# Patient Record
Sex: FEMALE | Race: WHITE | Employment: FULL TIME | URBAN - METROPOLITAN AREA
[De-identification: names, ages, dates, MRNs, and addresses within clinical notes are randomized per-mention and may not be internally consistent; named-entity substitution may affect disease eponyms.]

---

## 2020-11-03 ENCOUNTER — HOSPITAL ENCOUNTER (OUTPATIENT)
Dept: PREADMISSION TESTING | Age: 30
Discharge: HOME | End: 2020-11-03
Payer: COMMERCIAL

## 2020-11-03 VITALS
HEIGHT: 67 IN | SYSTOLIC BLOOD PRESSURE: 111 MMHG | TEMPERATURE: 97.8 F | BODY MASS INDEX: 22.76 KG/M2 | DIASTOLIC BLOOD PRESSURE: 65 MMHG | HEART RATE: 47 BPM | WEIGHT: 145 LBS

## 2020-11-03 LAB
ERYTHROCYTE [DISTWIDTH] IN BLOOD BY AUTOMATED COUNT: 11.9 % (ref 11.5–14.5)
HCT VFR BLD AUTO: 40 % (ref 35–47)
HGB BLD-MCNC: 13.5 G/DL (ref 11.5–16)
MCH RBC QN AUTO: 32.5 PG (ref 26–34)
MCHC RBC AUTO-ENTMCNC: 33.8 G/DL (ref 30–36.5)
MCV RBC AUTO: 96.2 FL (ref 80–99)
NRBC # BLD: 0 K/UL (ref 0–0.01)
NRBC BLD-RTO: 0 PER 100 WBC
PLATELET # BLD AUTO: 246 K/UL (ref 150–400)
PMV BLD AUTO: 10.8 FL (ref 8.9–12.9)
RBC # BLD AUTO: 4.16 M/UL (ref 3.8–5.2)
WBC # BLD AUTO: 5.9 K/UL (ref 3.6–11)

## 2020-11-03 PROCEDURE — 36415 COLL VENOUS BLD VENIPUNCTURE: CPT

## 2020-11-03 PROCEDURE — 85027 COMPLETE CBC AUTOMATED: CPT

## 2020-11-03 RX ORDER — CHOLECALCIFEROL (VITAMIN D3) 50 MCG
CAPSULE ORAL
COMMUNITY

## 2020-11-03 NOTE — PERIOP NOTES
PATIENT MADE AWARE OF NEED FOR COVID-19 TESTING WITHIN 96 HOURS OF SURGERY. PATIENT INSTRUCTED TO EXPECT A CALL TO SCHEDULE APPT FOR TEST. PATIENT INSTRUCTED TO SELF QUARANTINE BETWEEN TESTING AND ARRIVAL TIME DAY OF SURGERY. Patient verbalizes understanding of preoperative instructions:  Given skin prep chlorhexidine wipes-given written and verbal instructions on use. Patient given surgical site infection FAQs handout and hand hygiene tips sheet. Pre-operative instructions reviewed and patient verbalizes understanding of instructions. Patient has been given the opportunity to ask additional questions. Pre-Operative Instructions    DO NOT EAT OR DRINK ANYTHING AFTER MIDNIGHT THE NIGHT BEFORE SURGERY.

## 2020-11-06 ENCOUNTER — HOSPITAL ENCOUNTER (OUTPATIENT)
Dept: PREADMISSION TESTING | Age: 30
Discharge: HOME | End: 2020-11-06
Payer: COMMERCIAL

## 2020-11-06 ENCOUNTER — TRANSCRIBE ORDER (OUTPATIENT)
Dept: REGISTRATION | Age: 30
End: 2020-11-06

## 2020-11-06 DIAGNOSIS — Z01.812 PRE-PROCEDURE LAB EXAM: ICD-10-CM

## 2020-11-06 DIAGNOSIS — Z01.812 PRE-PROCEDURE LAB EXAM: Primary | ICD-10-CM

## 2020-11-06 PROCEDURE — 87635 SARS-COV-2 COVID-19 AMP PRB: CPT

## 2020-11-07 LAB — SARS-COV-2, COV2NT: NOT DETECTED

## 2020-11-09 ENCOUNTER — ANESTHESIA EVENT (OUTPATIENT)
Dept: SURGERY | Age: 30
DRG: 743 | End: 2020-11-09
Payer: COMMERCIAL

## 2020-11-10 ENCOUNTER — HOSPITAL ENCOUNTER (INPATIENT)
Age: 30
LOS: 2 days | Discharge: HOME | DRG: 743 | End: 2020-11-12
Attending: OBSTETRICS & GYNECOLOGY | Admitting: OBSTETRICS & GYNECOLOGY
Payer: COMMERCIAL

## 2020-11-10 ENCOUNTER — ANESTHESIA (OUTPATIENT)
Dept: SURGERY | Age: 30
DRG: 743 | End: 2020-11-10
Payer: COMMERCIAL

## 2020-11-10 DIAGNOSIS — G89.18 POSTOPERATIVE PAIN: Primary | ICD-10-CM

## 2020-11-10 PROBLEM — D25.9 FIBROID UTERUS: Status: ACTIVE | Noted: 2020-11-10

## 2020-11-10 LAB
BASOPHILS # BLD: 0 K/UL (ref 0–0.1)
BASOPHILS NFR BLD: 0 % (ref 0–1)
DIFFERENTIAL METHOD BLD: ABNORMAL
EOSINOPHIL # BLD: 0 K/UL (ref 0–0.4)
EOSINOPHIL NFR BLD: 0 % (ref 0–7)
ERYTHROCYTE [DISTWIDTH] IN BLOOD BY AUTOMATED COUNT: 11.8 % (ref 11.5–14.5)
HCG UR QL: NEGATIVE
HCT VFR BLD AUTO: 34 % (ref 35–47)
HGB BLD-MCNC: 11.8 G/DL (ref 11.5–16)
IMM GRANULOCYTES # BLD AUTO: 0 K/UL (ref 0–0.04)
IMM GRANULOCYTES NFR BLD AUTO: 0 % (ref 0–0.5)
LYMPHOCYTES # BLD: 1.1 K/UL (ref 0.8–3.5)
LYMPHOCYTES NFR BLD: 8 % (ref 12–49)
MCH RBC QN AUTO: 32.7 PG (ref 26–34)
MCHC RBC AUTO-ENTMCNC: 34.7 G/DL (ref 30–36.5)
MCV RBC AUTO: 94.2 FL (ref 80–99)
MONOCYTES # BLD: 0.9 K/UL (ref 0–1)
MONOCYTES NFR BLD: 7 % (ref 5–13)
NEUTS SEG # BLD: 11.1 K/UL (ref 1.8–8)
NEUTS SEG NFR BLD: 85 % (ref 32–75)
NRBC # BLD: 0 K/UL (ref 0–0.01)
NRBC BLD-RTO: 0 PER 100 WBC
PLATELET # BLD AUTO: 210 K/UL (ref 150–400)
PMV BLD AUTO: 10.4 FL (ref 8.9–12.9)
RBC # BLD AUTO: 3.61 M/UL (ref 3.8–5.2)
WBC # BLD AUTO: 13.1 K/UL (ref 3.6–11)

## 2020-11-10 PROCEDURE — 77030040361 HC SLV COMPR DVT MDII -B: Performed by: OBSTETRICS & GYNECOLOGY

## 2020-11-10 PROCEDURE — 74011250636 HC RX REV CODE- 250/636: Performed by: OBSTETRICS & GYNECOLOGY

## 2020-11-10 PROCEDURE — 77030031139 HC SUT VCRL2 J&J -A: Performed by: OBSTETRICS & GYNECOLOGY

## 2020-11-10 PROCEDURE — 74011250636 HC RX REV CODE- 250/636: Performed by: NURSE ANESTHETIST, CERTIFIED REGISTERED

## 2020-11-10 PROCEDURE — 74011000250 HC RX REV CODE- 250: Performed by: NURSE ANESTHETIST, CERTIFIED REGISTERED

## 2020-11-10 PROCEDURE — 77030008684 HC TU ET CUF COVD -B: Performed by: ANESTHESIOLOGY

## 2020-11-10 PROCEDURE — 85025 COMPLETE CBC W/AUTO DIFF WBC: CPT

## 2020-11-10 PROCEDURE — 77030008462 HC STPLR SKN PROX J&J -A: Performed by: OBSTETRICS & GYNECOLOGY

## 2020-11-10 PROCEDURE — 76210000006 HC OR PH I REC 0.5 TO 1 HR: Performed by: OBSTETRICS & GYNECOLOGY

## 2020-11-10 PROCEDURE — 36415 COLL VENOUS BLD VENIPUNCTURE: CPT

## 2020-11-10 PROCEDURE — 77030002933 HC SUT MCRYL J&J -A: Performed by: OBSTETRICS & GYNECOLOGY

## 2020-11-10 PROCEDURE — 77030041075 HC DRSG AG OPTIFRM MDII -B: Performed by: OBSTETRICS & GYNECOLOGY

## 2020-11-10 PROCEDURE — 2709999900 HC NON-CHARGEABLE SUPPLY: Performed by: OBSTETRICS & GYNECOLOGY

## 2020-11-10 PROCEDURE — 74011250637 HC RX REV CODE- 250/637: Performed by: ANESTHESIOLOGY

## 2020-11-10 PROCEDURE — 88305 TISSUE EXAM BY PATHOLOGIST: CPT

## 2020-11-10 PROCEDURE — 0UB90ZZ EXCISION OF UTERUS, OPEN APPROACH: ICD-10-PCS | Performed by: OBSTETRICS & GYNECOLOGY

## 2020-11-10 PROCEDURE — 76060000035 HC ANESTHESIA 2 TO 2.5 HR: Performed by: OBSTETRICS & GYNECOLOGY

## 2020-11-10 PROCEDURE — 77030002974 HC SUT PLN J&J -A: Performed by: OBSTETRICS & GYNECOLOGY

## 2020-11-10 PROCEDURE — 74011000258 HC RX REV CODE- 258: Performed by: OBSTETRICS & GYNECOLOGY

## 2020-11-10 PROCEDURE — 99218 HC RM OBSERVATION: CPT

## 2020-11-10 PROCEDURE — 74011250637 HC RX REV CODE- 250/637: Performed by: NURSE ANESTHETIST, CERTIFIED REGISTERED

## 2020-11-10 PROCEDURE — 88331 PATH CONSLTJ SURG 1 BLK 1SPC: CPT

## 2020-11-10 PROCEDURE — 74011250636 HC RX REV CODE- 250/636: Performed by: ANESTHESIOLOGY

## 2020-11-10 PROCEDURE — 76010000131 HC OR TIME 2 TO 2.5 HR: Performed by: OBSTETRICS & GYNECOLOGY

## 2020-11-10 PROCEDURE — 81025 URINE PREGNANCY TEST: CPT

## 2020-11-10 PROCEDURE — 77030026438 HC STYL ET INTUB CARD -A: Performed by: ANESTHESIOLOGY

## 2020-11-10 PROCEDURE — 77030040830 HC CATH URETH FOL MDII -A: Performed by: OBSTETRICS & GYNECOLOGY

## 2020-11-10 PROCEDURE — 74011250637 HC RX REV CODE- 250/637: Performed by: OBSTETRICS & GYNECOLOGY

## 2020-11-10 PROCEDURE — 77030040922 HC BLNKT HYPOTHRM STRY -A

## 2020-11-10 PROCEDURE — 65410000002 HC RM PRIVATE OB

## 2020-11-10 PROCEDURE — C1765 ADHESION BARRIER: HCPCS | Performed by: OBSTETRICS & GYNECOLOGY

## 2020-11-10 RX ORDER — IBUPROFEN 400 MG/1
800 TABLET ORAL
Status: DISCONTINUED | OUTPATIENT
Start: 2020-11-10 | End: 2020-11-12 | Stop reason: HOSPADM

## 2020-11-10 RX ORDER — SODIUM CHLORIDE 0.9 % (FLUSH) 0.9 %
5-40 SYRINGE (ML) INJECTION EVERY 8 HOURS
Status: DISCONTINUED | OUTPATIENT
Start: 2020-11-10 | End: 2020-11-10 | Stop reason: HOSPADM

## 2020-11-10 RX ORDER — SODIUM CHLORIDE 0.9 % (FLUSH) 0.9 %
5-40 SYRINGE (ML) INJECTION EVERY 8 HOURS
Status: DISCONTINUED | OUTPATIENT
Start: 2020-11-10 | End: 2020-11-12 | Stop reason: HOSPADM

## 2020-11-10 RX ORDER — DEXAMETHASONE SODIUM PHOSPHATE 4 MG/ML
INJECTION, SOLUTION INTRA-ARTICULAR; INTRALESIONAL; INTRAMUSCULAR; INTRAVENOUS; SOFT TISSUE AS NEEDED
Status: DISCONTINUED | OUTPATIENT
Start: 2020-11-10 | End: 2020-11-10 | Stop reason: HOSPADM

## 2020-11-10 RX ORDER — EPHEDRINE SULFATE/0.9% NACL/PF 50 MG/5 ML
SYRINGE (ML) INTRAVENOUS AS NEEDED
Status: DISCONTINUED | OUTPATIENT
Start: 2020-11-10 | End: 2020-11-10 | Stop reason: HOSPADM

## 2020-11-10 RX ORDER — HYDROMORPHONE HYDROCHLORIDE 1 MG/ML
0.2 INJECTION, SOLUTION INTRAMUSCULAR; INTRAVENOUS; SUBCUTANEOUS
Status: DISCONTINUED | OUTPATIENT
Start: 2020-11-10 | End: 2020-11-10 | Stop reason: HOSPADM

## 2020-11-10 RX ORDER — OXYCODONE HYDROCHLORIDE 5 MG/1
5 TABLET ORAL AS NEEDED
Status: DISCONTINUED | OUTPATIENT
Start: 2020-11-10 | End: 2020-11-10 | Stop reason: HOSPADM

## 2020-11-10 RX ORDER — DIPHENHYDRAMINE HYDROCHLORIDE 50 MG/ML
12.5 INJECTION, SOLUTION INTRAMUSCULAR; INTRAVENOUS AS NEEDED
Status: DISCONTINUED | OUTPATIENT
Start: 2020-11-10 | End: 2020-11-10 | Stop reason: HOSPADM

## 2020-11-10 RX ORDER — ONDANSETRON 2 MG/ML
4 INJECTION INTRAMUSCULAR; INTRAVENOUS AS NEEDED
Status: DISCONTINUED | OUTPATIENT
Start: 2020-11-10 | End: 2020-11-10 | Stop reason: HOSPADM

## 2020-11-10 RX ORDER — ONDANSETRON 2 MG/ML
4 INJECTION INTRAMUSCULAR; INTRAVENOUS
Status: DISCONTINUED | OUTPATIENT
Start: 2020-11-10 | End: 2020-11-12 | Stop reason: HOSPADM

## 2020-11-10 RX ORDER — MIDAZOLAM HYDROCHLORIDE 1 MG/ML
1 INJECTION, SOLUTION INTRAMUSCULAR; INTRAVENOUS AS NEEDED
Status: DISCONTINUED | OUTPATIENT
Start: 2020-11-10 | End: 2020-11-10 | Stop reason: HOSPADM

## 2020-11-10 RX ORDER — ACETAMINOPHEN 325 MG/1
650 TABLET ORAL ONCE
Status: COMPLETED | OUTPATIENT
Start: 2020-11-10 | End: 2020-11-10

## 2020-11-10 RX ORDER — MORPHINE SULFATE 2 MG/ML
2 INJECTION, SOLUTION INTRAMUSCULAR; INTRAVENOUS
Status: DISCONTINUED | OUTPATIENT
Start: 2020-11-10 | End: 2020-11-12 | Stop reason: HOSPADM

## 2020-11-10 RX ORDER — SODIUM CHLORIDE 9 MG/ML
25 INJECTION, SOLUTION INTRAVENOUS CONTINUOUS
Status: DISCONTINUED | OUTPATIENT
Start: 2020-11-10 | End: 2020-11-10 | Stop reason: HOSPADM

## 2020-11-10 RX ORDER — SCOLOPAMINE TRANSDERMAL SYSTEM 1 MG/1
PATCH, EXTENDED RELEASE TRANSDERMAL AS NEEDED
Status: DISCONTINUED | OUTPATIENT
Start: 2020-11-10 | End: 2020-11-10 | Stop reason: HOSPADM

## 2020-11-10 RX ORDER — DEXTROSE, SODIUM CHLORIDE, SODIUM LACTATE, POTASSIUM CHLORIDE, AND CALCIUM CHLORIDE 5; .6; .31; .03; .02 G/100ML; G/100ML; G/100ML; G/100ML; G/100ML
125 INJECTION, SOLUTION INTRAVENOUS CONTINUOUS
Status: DISCONTINUED | OUTPATIENT
Start: 2020-11-10 | End: 2020-11-12

## 2020-11-10 RX ORDER — FENTANYL CITRATE 50 UG/ML
INJECTION, SOLUTION INTRAMUSCULAR; INTRAVENOUS AS NEEDED
Status: DISCONTINUED | OUTPATIENT
Start: 2020-11-10 | End: 2020-11-10 | Stop reason: HOSPADM

## 2020-11-10 RX ORDER — SODIUM CHLORIDE, SODIUM LACTATE, POTASSIUM CHLORIDE, CALCIUM CHLORIDE 600; 310; 30; 20 MG/100ML; MG/100ML; MG/100ML; MG/100ML
25 INJECTION, SOLUTION INTRAVENOUS CONTINUOUS
Status: DISCONTINUED | OUTPATIENT
Start: 2020-11-10 | End: 2020-11-10 | Stop reason: HOSPADM

## 2020-11-10 RX ORDER — DOCUSATE SODIUM 100 MG/1
100 CAPSULE, LIQUID FILLED ORAL 2 TIMES DAILY
Status: DISCONTINUED | OUTPATIENT
Start: 2020-11-10 | End: 2020-11-12 | Stop reason: HOSPADM

## 2020-11-10 RX ORDER — PROPOFOL 10 MG/ML
INJECTION, EMULSION INTRAVENOUS AS NEEDED
Status: DISCONTINUED | OUTPATIENT
Start: 2020-11-10 | End: 2020-11-10 | Stop reason: HOSPADM

## 2020-11-10 RX ORDER — SODIUM CHLORIDE 0.9 % (FLUSH) 0.9 %
5-40 SYRINGE (ML) INJECTION AS NEEDED
Status: DISCONTINUED | OUTPATIENT
Start: 2020-11-10 | End: 2020-11-12 | Stop reason: HOSPADM

## 2020-11-10 RX ORDER — DIPHENHYDRAMINE HYDROCHLORIDE 50 MG/ML
12.5 INJECTION, SOLUTION INTRAMUSCULAR; INTRAVENOUS
Status: DISCONTINUED | OUTPATIENT
Start: 2020-11-10 | End: 2020-11-12 | Stop reason: HOSPADM

## 2020-11-10 RX ORDER — MIDAZOLAM HYDROCHLORIDE 1 MG/ML
0.5 INJECTION, SOLUTION INTRAMUSCULAR; INTRAVENOUS
Status: DISCONTINUED | OUTPATIENT
Start: 2020-11-10 | End: 2020-11-10 | Stop reason: HOSPADM

## 2020-11-10 RX ORDER — ONDANSETRON 2 MG/ML
INJECTION INTRAMUSCULAR; INTRAVENOUS AS NEEDED
Status: DISCONTINUED | OUTPATIENT
Start: 2020-11-10 | End: 2020-11-10 | Stop reason: HOSPADM

## 2020-11-10 RX ORDER — KETOROLAC TROMETHAMINE 30 MG/ML
INJECTION, SOLUTION INTRAMUSCULAR; INTRAVENOUS AS NEEDED
Status: DISCONTINUED | OUTPATIENT
Start: 2020-11-10 | End: 2020-11-10 | Stop reason: HOSPADM

## 2020-11-10 RX ORDER — LIDOCAINE HYDROCHLORIDE 10 MG/ML
0.1 INJECTION, SOLUTION EPIDURAL; INFILTRATION; INTRACAUDAL; PERINEURAL AS NEEDED
Status: DISCONTINUED | OUTPATIENT
Start: 2020-11-10 | End: 2020-11-10 | Stop reason: HOSPADM

## 2020-11-10 RX ORDER — MORPHINE SULFATE 10 MG/ML
2 INJECTION, SOLUTION INTRAMUSCULAR; INTRAVENOUS
Status: DISCONTINUED | OUTPATIENT
Start: 2020-11-10 | End: 2020-11-10 | Stop reason: HOSPADM

## 2020-11-10 RX ORDER — DEXMEDETOMIDINE HYDROCHLORIDE 100 UG/ML
INJECTION, SOLUTION INTRAVENOUS AS NEEDED
Status: DISCONTINUED | OUTPATIENT
Start: 2020-11-10 | End: 2020-11-10 | Stop reason: HOSPADM

## 2020-11-10 RX ORDER — LIDOCAINE HYDROCHLORIDE 20 MG/ML
INJECTION, SOLUTION EPIDURAL; INFILTRATION; INTRACAUDAL; PERINEURAL AS NEEDED
Status: DISCONTINUED | OUTPATIENT
Start: 2020-11-10 | End: 2020-11-10 | Stop reason: HOSPADM

## 2020-11-10 RX ORDER — OXYCODONE AND ACETAMINOPHEN 5; 325 MG/1; MG/1
1 TABLET ORAL
Status: DISCONTINUED | OUTPATIENT
Start: 2020-11-10 | End: 2020-11-12 | Stop reason: HOSPADM

## 2020-11-10 RX ORDER — FENTANYL CITRATE 50 UG/ML
50 INJECTION, SOLUTION INTRAMUSCULAR; INTRAVENOUS AS NEEDED
Status: DISCONTINUED | OUTPATIENT
Start: 2020-11-10 | End: 2020-11-10 | Stop reason: HOSPADM

## 2020-11-10 RX ORDER — SODIUM CHLORIDE 0.9 % (FLUSH) 0.9 %
5-40 SYRINGE (ML) INJECTION AS NEEDED
Status: DISCONTINUED | OUTPATIENT
Start: 2020-11-10 | End: 2020-11-10 | Stop reason: HOSPADM

## 2020-11-10 RX ORDER — SUCCINYLCHOLINE CHLORIDE 20 MG/ML
INJECTION INTRAMUSCULAR; INTRAVENOUS AS NEEDED
Status: DISCONTINUED | OUTPATIENT
Start: 2020-11-10 | End: 2020-11-10 | Stop reason: HOSPADM

## 2020-11-10 RX ORDER — KETAMINE HYDROCHLORIDE 10 MG/ML
INJECTION, SOLUTION INTRAMUSCULAR; INTRAVENOUS AS NEEDED
Status: DISCONTINUED | OUTPATIENT
Start: 2020-11-10 | End: 2020-11-10 | Stop reason: HOSPADM

## 2020-11-10 RX ORDER — GLYCOPYRROLATE 0.2 MG/ML
INJECTION INTRAMUSCULAR; INTRAVENOUS AS NEEDED
Status: DISCONTINUED | OUTPATIENT
Start: 2020-11-10 | End: 2020-11-10 | Stop reason: HOSPADM

## 2020-11-10 RX ORDER — FENTANYL CITRATE 50 UG/ML
25 INJECTION, SOLUTION INTRAMUSCULAR; INTRAVENOUS
Status: DISCONTINUED | OUTPATIENT
Start: 2020-11-10 | End: 2020-11-10 | Stop reason: HOSPADM

## 2020-11-10 RX ORDER — HYDROMORPHONE HYDROCHLORIDE 2 MG/ML
INJECTION, SOLUTION INTRAMUSCULAR; INTRAVENOUS; SUBCUTANEOUS AS NEEDED
Status: DISCONTINUED | OUTPATIENT
Start: 2020-11-10 | End: 2020-11-10 | Stop reason: HOSPADM

## 2020-11-10 RX ORDER — CEFAZOLIN SODIUM/WATER 2 G/20 ML
2 SYRINGE (ML) INTRAVENOUS ONCE
Status: COMPLETED | OUTPATIENT
Start: 2020-11-10 | End: 2020-11-10

## 2020-11-10 RX ORDER — MIDAZOLAM HYDROCHLORIDE 1 MG/ML
INJECTION, SOLUTION INTRAMUSCULAR; INTRAVENOUS AS NEEDED
Status: DISCONTINUED | OUTPATIENT
Start: 2020-11-10 | End: 2020-11-10 | Stop reason: HOSPADM

## 2020-11-10 RX ORDER — SODIUM CHLORIDE, SODIUM LACTATE, POTASSIUM CHLORIDE, CALCIUM CHLORIDE 600; 310; 30; 20 MG/100ML; MG/100ML; MG/100ML; MG/100ML
125 INJECTION, SOLUTION INTRAVENOUS CONTINUOUS
Status: DISCONTINUED | OUTPATIENT
Start: 2020-11-10 | End: 2020-11-10 | Stop reason: HOSPADM

## 2020-11-10 RX ORDER — ROCURONIUM BROMIDE 10 MG/ML
INJECTION, SOLUTION INTRAVENOUS AS NEEDED
Status: DISCONTINUED | OUTPATIENT
Start: 2020-11-10 | End: 2020-11-10 | Stop reason: HOSPADM

## 2020-11-10 RX ADMIN — IBUPROFEN 800 MG: 400 TABLET, FILM COATED ORAL at 17:42

## 2020-11-10 RX ADMIN — DOCUSATE SODIUM 100 MG: 100 CAPSULE, LIQUID FILLED ORAL at 17:42

## 2020-11-10 RX ADMIN — GLYCOPYRROLATE 0.2 MG: 0.2 INJECTION, SOLUTION INTRAMUSCULAR; INTRAVENOUS at 11:54

## 2020-11-10 RX ADMIN — ONDANSETRON 4 MG: 2 INJECTION INTRAMUSCULAR; INTRAVENOUS at 22:42

## 2020-11-10 RX ADMIN — ROCURONIUM BROMIDE 25 MG: 10 SOLUTION INTRAVENOUS at 12:02

## 2020-11-10 RX ADMIN — HYDROMORPHONE HYDROCHLORIDE 0.2 MG: 2 INJECTION, SOLUTION INTRAMUSCULAR; INTRAVENOUS; SUBCUTANEOUS at 13:18

## 2020-11-10 RX ADMIN — Medication 5 ML: at 22:00

## 2020-11-10 RX ADMIN — PROPOFOL 150 MG: 10 INJECTION, EMULSION INTRAVENOUS at 11:55

## 2020-11-10 RX ADMIN — Medication 2 G: at 12:05

## 2020-11-10 RX ADMIN — DEXMEDETOMIDINE HYDROCHLORIDE 10 MCG: 100 INJECTION, SOLUTION, CONCENTRATE INTRAVENOUS at 12:04

## 2020-11-10 RX ADMIN — HYDROMORPHONE HYDROCHLORIDE 0.5 MG: 2 INJECTION, SOLUTION INTRAMUSCULAR; INTRAVENOUS; SUBCUTANEOUS at 13:55

## 2020-11-10 RX ADMIN — HYDROMORPHONE HYDROCHLORIDE 0.3 MG: 2 INJECTION, SOLUTION INTRAMUSCULAR; INTRAVENOUS; SUBCUTANEOUS at 13:34

## 2020-11-10 RX ADMIN — FENTANYL CITRATE 50 MCG: 50 INJECTION, SOLUTION INTRAMUSCULAR; INTRAVENOUS at 12:00

## 2020-11-10 RX ADMIN — SCOPALAMINE 1 PATCH: 1 PATCH, EXTENDED RELEASE TRANSDERMAL at 11:50

## 2020-11-10 RX ADMIN — DEXAMETHASONE SODIUM PHOSPHATE 4 MG: 4 INJECTION, SOLUTION INTRAMUSCULAR; INTRAVENOUS at 12:11

## 2020-11-10 RX ADMIN — KETAMINE HYDROCHLORIDE 10 MG: 10 INJECTION, SOLUTION INTRAMUSCULAR; INTRAVENOUS at 12:16

## 2020-11-10 RX ADMIN — SODIUM CHLORIDE, SODIUM LACTATE, POTASSIUM CHLORIDE, AND CALCIUM CHLORIDE 25 ML/HR: 600; 310; 30; 20 INJECTION, SOLUTION INTRAVENOUS at 11:11

## 2020-11-10 RX ADMIN — DEXMEDETOMIDINE HYDROCHLORIDE 10 MCG: 100 INJECTION, SOLUTION, CONCENTRATE INTRAVENOUS at 12:15

## 2020-11-10 RX ADMIN — MIDAZOLAM 2 MG: 1 INJECTION INTRAMUSCULAR; INTRAVENOUS at 11:48

## 2020-11-10 RX ADMIN — PROPOFOL 50 MG: 10 INJECTION, EMULSION INTRAVENOUS at 11:58

## 2020-11-10 RX ADMIN — SODIUM CHLORIDE, SODIUM LACTATE, POTASSIUM CHLORIDE, CALCIUM CHLORIDE, AND DEXTROSE MONOHYDRATE 125 ML/HR: 600; 310; 30; 20; 5 INJECTION, SOLUTION INTRAVENOUS at 14:00

## 2020-11-10 RX ADMIN — SUGAMMADEX 132 MG: 100 INJECTION, SOLUTION INTRAVENOUS at 13:15

## 2020-11-10 RX ADMIN — FENTANYL CITRATE 50 MCG: 50 INJECTION, SOLUTION INTRAMUSCULAR; INTRAVENOUS at 11:55

## 2020-11-10 RX ADMIN — ONDANSETRON HYDROCHLORIDE 4 MG: 2 INJECTION, SOLUTION INTRAMUSCULAR; INTRAVENOUS at 12:26

## 2020-11-10 RX ADMIN — KETAMINE HYDROCHLORIDE 20 MG: 10 INJECTION, SOLUTION INTRAMUSCULAR; INTRAVENOUS at 11:56

## 2020-11-10 RX ADMIN — HYDROMORPHONE HYDROCHLORIDE 0.5 MG: 2 INJECTION, SOLUTION INTRAMUSCULAR; INTRAVENOUS; SUBCUTANEOUS at 14:00

## 2020-11-10 RX ADMIN — SUCCINYLCHOLINE CHLORIDE 120 MG: 20 INJECTION, SOLUTION INTRAMUSCULAR; INTRAVENOUS at 11:56

## 2020-11-10 RX ADMIN — ROCURONIUM BROMIDE 10 MG: 10 SOLUTION INTRAVENOUS at 11:55

## 2020-11-10 RX ADMIN — ACETAMINOPHEN 650 MG: 325 TABLET ORAL at 11:17

## 2020-11-10 RX ADMIN — HYDROMORPHONE HYDROCHLORIDE 0.2 MG: 2 INJECTION, SOLUTION INTRAMUSCULAR; INTRAVENOUS; SUBCUTANEOUS at 13:33

## 2020-11-10 RX ADMIN — KETOROLAC TROMETHAMINE 30 MG: 30 INJECTION, SOLUTION INTRAMUSCULAR; INTRAVENOUS at 13:13

## 2020-11-10 RX ADMIN — ROCURONIUM BROMIDE 10 MG: 10 SOLUTION INTRAVENOUS at 12:25

## 2020-11-10 RX ADMIN — Medication 10 MG: at 12:34

## 2020-11-10 RX ADMIN — HYDROMORPHONE HYDROCHLORIDE 0.3 MG: 2 INJECTION, SOLUTION INTRAMUSCULAR; INTRAVENOUS; SUBCUTANEOUS at 13:07

## 2020-11-10 RX ADMIN — PROPOFOL 50 MG: 10 INJECTION, EMULSION INTRAVENOUS at 13:06

## 2020-11-10 RX ADMIN — LIDOCAINE HYDROCHLORIDE 60 MG: 20 INJECTION, SOLUTION EPIDURAL; INFILTRATION; INTRACAUDAL; PERINEURAL at 11:55

## 2020-11-10 NOTE — ANESTHESIA POSTPROCEDURE EVALUATION
Post-Anesthesia Evaluation and Assessment    Patient: Zach Ragland MRN: [id number]  SSN: [id number]    YOB: 1990  Age: 26 y.o. Sex: female      I have evaluated the patient and they are stable and ready for discharge from the PACU. Cardiovascular Function/Vital Signs  Visit Vitals  BP (!) 101/52   Pulse 78   Temp 36.9 °C (98.5 °F)   Resp 17   Ht 5' 7\" (1.702 m)   Wt 65.8 kg (145 lb)   SpO2 97%   BMI 22.71 kg/m²       Patient is status post General anesthesia for Procedure(s):  ABDOMINAL MYOMECTOMY. Nausea/Vomiting: None    Postoperative hydration reviewed and adequate. Pain:  Pain Scale 1: Numeric (0 - 10) (11/10/20 1430)  Pain Intensity 1: 4 (11/10/20 1430)   Managed    Neurological Status:   Neuro (WDL): Within Defined Limits (11/10/20 1430)  Neuro  LUE Motor Response: Purposeful (11/10/20 1430)  LLE Motor Response: Purposeful (11/10/20 1430)  RUE Motor Response: Purposeful (11/10/20 1430)  RLE Motor Response: Purposeful (11/10/20 1430)   At baseline    Mental Status, Level of Consciousness: Alert and  oriented to person, place, and time    Pulmonary Status:   O2 Device: Room air (11/10/20 1430)   Adequate oxygenation and airway patent    Complications related to anesthesia: None    Post-anesthesia assessment completed. No concerns    Signed By: Bharathi Byers MD     November 10, 2020              Procedure(s):  ABDOMINAL MYOMECTOMY. general    <BSHSIANPOST>    INITIAL Post-op Vital signs:   Vitals Value Taken Time   /52 11/10/2020  2:45 PM   Temp 36.9 °C (98.5 °F) 11/10/2020  2:30 PM   Pulse 79 11/10/2020  2:49 PM   Resp 17 11/10/2020  2:49 PM   SpO2 94 % 11/10/2020  3:17 PM   Vitals shown include unvalidated device data.

## 2020-11-10 NOTE — OP NOTES
[address]   OPERATIVE REPORT    Name:  Dada Fajardo  MR#:  [id number]  :  1990  ACCOUNT #:  [de-identified]  DATE OF SERVICE:  11/10/2020      PREOPERATIVE DIAGNOSIS:  Fibroid uterus. POSTOPERATIVE DIAGNOSIS:  Fibroid uterus. PROCEDURE PERFORMED:  Abdominal myomectomy. SURGEON:  Darion Fernandez DO    ASSISTANT:  Raúl Cantrell, surgical assist.    ANESTHESIA:  General endotracheal.    COMPLICATIONS:  None. SPECIMENS REMOVED:  Fibroid. IMPLANTS:  Seprafilm. ESTIMATED BLOOD LOSS:  50 mL. IV FLUIDS:  1000 mL of lactated Ringer's. URINE OUTPUT:  50 mL of clear urine. FINDINGS:  Exam under anesthesia revealed a 10-week size mobile fibroid uterus with no adnexal masses. Intraoperatively, the uterus had an approximately 10 cm x 5 cm fibroid arising from the anterior lower uterine segment intramurally. There was another 2 cm pedunculated fibroid arising from the anterior fundus. The fallopian tubes and ovaries were normal bilaterally. The uterus otherwise appeared normal.    PROCEDURE:  The patient was consented including risks and benefits of the procedure and agreed to proceed. She was taken to the operating room where anesthesia was found to be adequate. She was placed in dorsal supine position and prepped and draped in the usual sterile fashion. A Pfannenstiel skin incision was made approximately 2 cm above the pubic symphysis and carried down to the underlying layer of fascia with Bovie. The fascia was incised in the midline and the incision was extended laterally in both directions. The superior aspect of the fascial incision was grasped with Kocher clamps, elevated, and the rectus muscles dissected off bluntly. In a similar fashion, the inferior aspect of the fascial incision was grasped with Kocher clamps, elevated, and the rectus muscles dissected off sharply.   The rectus muscles were  in the midline and the peritoneum entered in a blunt fashion. This opening was extended both superiorly and inferiorly with good visualization of the bladder. The pelvis was inspected and the bowel was packed away with 2 moist laparotomy sponges. The O'Rex-O'Mohamud retractor was placed through the incision to provide retraction. Pitressin solution was then injected in the myometrium along the planned course of incision. A vertical incision was then made over the fibroid at this site and the fibroid was carefully shelled out with a sharp and blunt dissection. There were areas that appeared cystic with mucoid contents. Dr. Fozia Cardona with GYN Oncology was called in to evaluate the specimen. He agreed with just sending frozen which did return as benign. The uterus through this incision was inspected and it was noted that the cavity was never entered. The myometrium was closed in two layers with 0 Monocryl in a running fashion. The serosa was closed in a baseball stitch of 3-0 Monocryl. Good hemostasis was confirmed. There was a small 2-3 cm hematoma to the right of the area which was well contained and not growing. The pelvis was copiously suction irrigated and hemostasis was confirmed. The laparotomy sponges were removed and the O'Rex-O'Mohamud retractor was removed as well. Seprafilm was placed over the repair. The peritoneum was closed with 2-0 Vicryl in a running fashion. The fascia was closed with #1 Vicryl in a running fashion. The subcutaneous layer was reapproximated with 3 interrupted stitches of 2-0 plain gut. The skin was closed with 4-0 Monocryl in a running subcuticular fashion. A silver dressing was applied. The patient was awakened from anesthesia and taken to the recovery room in stable condition. All sponge, lap, needle, and instrument counts were correct.       DO RAYNE [id number]  D:  11/10/2020 14:12  T:  11/10/2020 17:07  JOB #:  [id number]

## 2020-11-10 NOTE — BRIEF OP NOTE
Brief Postoperative Note    Patient: Lila Garcia  YOB: 1990  MRN: [id number]    Date of Procedure: 11/10/2020     Pre-Op Diagnosis: FIBROID UTERUS    Post-Op Diagnosis: Same as preoperative diagnosis. Procedure(s):  ABDOMINAL MYOMECTOMY    Surgeon(s):  Maria Esther Francisco DO    Surgical Assistant: Surg Asst-1: Mariano Jarquin    Anesthesia: General     Estimated Blood Loss (mL): 91PD    Complications: None    Specimens:   ID Type Source Tests Collected by Time Destination   1 : Firbroid Frozen Section Uterine  Maria Esther Francisco DO 11/10/2020 1226 Pathology        Implants: * No implants in log *    Drains:   Orogastric Tube 11/10/20 (Active)       Findings: EUA-10 week mobile fibroid uterus. No adnexal masses. Intraop-approximate 10 cm x 5cm anterior intramural fibroid-very soft and muco-cystic in places. 2cm peduculated fibroid arising from the anterior right fundus. Normal fallopian tubes and ovaries bilaterally. Frozen path-infarcted fibroid.       Electronically Signed by Candice Lima DO on 11/10/2020 at 1:30 PM

## 2020-11-10 NOTE — PROGRESS NOTES
TRANSFER - IN REPORT:    Verbal report received from [facility] RN(name) on [facility]  being received from Erly) for routine progression of care      Report consisted of patients Situation, Background, Assessment and   Recommendations(SBAR). Information from the following report(s) SBAR, Kardex, Intake/Output and MAR was reviewed with the receiving nurse. Opportunity for questions and clarification was provided. Assessment completed upon patients arrival to unit and care assumed.

## 2020-11-11 LAB
ERYTHROCYTE [DISTWIDTH] IN BLOOD BY AUTOMATED COUNT: 12 % (ref 11.5–14.5)
HCT VFR BLD AUTO: 34.4 % (ref 35–47)
HGB BLD-MCNC: 11.7 G/DL (ref 11.5–16)
MCH RBC QN AUTO: 32.6 PG (ref 26–34)
MCHC RBC AUTO-ENTMCNC: 34 G/DL (ref 30–36.5)
MCV RBC AUTO: 95.8 FL (ref 80–99)
NRBC # BLD: 0 K/UL (ref 0–0.01)
NRBC BLD-RTO: 0 PER 100 WBC
PLATELET # BLD AUTO: 207 K/UL (ref 150–400)
PMV BLD AUTO: 10.5 FL (ref 8.9–12.9)
RBC # BLD AUTO: 3.59 M/UL (ref 3.8–5.2)
WBC # BLD AUTO: 8.1 K/UL (ref 3.6–11)

## 2020-11-11 PROCEDURE — 85027 COMPLETE CBC AUTOMATED: CPT

## 2020-11-11 PROCEDURE — 36415 COLL VENOUS BLD VENIPUNCTURE: CPT

## 2020-11-11 PROCEDURE — 74011250637 HC RX REV CODE- 250/637: Performed by: OBSTETRICS & GYNECOLOGY

## 2020-11-11 PROCEDURE — 65410000002 HC RM PRIVATE OB

## 2020-11-11 RX ADMIN — DOCUSATE SODIUM 100 MG: 100 CAPSULE, LIQUID FILLED ORAL at 09:49

## 2020-11-11 RX ADMIN — OXYCODONE HYDROCHLORIDE AND ACETAMINOPHEN 1 TABLET: 5; 325 TABLET ORAL at 09:49

## 2020-11-11 RX ADMIN — Medication 5 ML: at 22:00

## 2020-11-11 RX ADMIN — Medication 5 ML: at 06:00

## 2020-11-11 RX ADMIN — OXYCODONE HYDROCHLORIDE AND ACETAMINOPHEN 1 TABLET: 5; 325 TABLET ORAL at 21:36

## 2020-11-11 RX ADMIN — IBUPROFEN 800 MG: 400 TABLET, FILM COATED ORAL at 13:40

## 2020-11-11 NOTE — PROGRESS NOTES
Gynecology Progress Note    Patient doing well post-op day 0 from Procedure(s): s/p  ABDOMINAL MYOMECTOMY without significant complaints. Pain controlled on current medication. Voiding without difficulty. Patient is not passing flatus. Denies cp/sob/n/v. Tolerating general diet. Vitals:  Blood pressure (!) 95/55, pulse (!) 55, temperature 97.8 °F (36.6 °C), resp. rate 16, height 5' 7\" (1.702 m), weight 65.8 kg (145 lb), last menstrual period 10/21/2020, SpO2 97 %. Temp (24hrs), Av.3 °F (36.8 °C), Min:97.4 °F (36.3 °C), Max:99.1 °F (37.3 °C)        Exam:  Patient without distress. Heart regular rate and rhythm   Lungs CTA b/l               Abdomen soft, positive bowel sounds, non-distended, appropriately tender. Bandage dry and clean without surrounding erythema. Lower extremities are negative for swelling, cords, or tenderness. Lab/Data Review:  CBC:   Lab Results   Component Value Date/Time    WBC 13.1 (H) 11/10/2020 04:53 PM    HGB 11.8 11/10/2020 04:53 PM    HCT 34.0 (L) 11/10/2020 04:53 PM     11/10/2020 04:53 PM       Assessment and Plan:  Patient appears to be having uncomplicated post Procedure(s): POD#0 s/p  ABDOMINAL MYOMECTOMY course. Continue routine post-op care. Plan for sy out in am, ambulate. Will switch to oral meds now as pt tolerating general diet.

## 2020-11-11 NOTE — PROGRESS NOTES
Bedside and Verbal shift change report given to Jose Carlos Harvey RN (oncoming nurse) by Adele Gunter (offgoing nurse). Report included the following information SBAR, Kardex, Intake/Output, MAR and Recent Results.

## 2020-11-11 NOTE — PROGRESS NOTES
Bedside shift change report given to Lam Payan RN (oncoming nurse) by Alvaro Huffman RN (offgoing nurse).  Report included the following information SBAR, Kardex, Procedure Summary, Intake/Output and MAR.     2100: Asked pt if she would like to go for a walk, pt stated she will walk in the AM.

## 2020-11-11 NOTE — PROGRESS NOTES
Problem: Pressure Injury - Risk of  Goal: *Prevention of pressure injury  Description: Document Shivam Scale and appropriate interventions in the flowsheet. Outcome: Progressing Towards Goal  Note: Pressure Injury Interventions: Activity Interventions: Increase time out of bed, Pressure redistribution bed/mattress(bed type)    Mobility Interventions: Pressure redistribution bed/mattress (bed type)    Nutrition Interventions: Document food/fluid/supplement intake                     Problem: Patient Education: Go to Patient Education Activity  Goal: Patient/Family Education  Outcome: Progressing Towards Goal     Problem: Pain  Goal: *Control of Pain  Outcome: Progressing Towards Goal     Problem: Falls - Risk of  Goal: *Absence of Falls  Description: Document Leonard Fall Risk and appropriate interventions in the flowsheet.   Outcome: Progressing Towards Goal  Note: Fall Risk Interventions:  Mobility Interventions: Patient to call before getting OOB         Medication Interventions: Patient to call before getting OOB, Teach patient to arise slowly    Elimination Interventions: Call light in reach              Problem: Patient Education: Go to Patient Education Activity  Goal: Patient/Family Education  Outcome: Progressing Towards Goal     Problem: Surgical Pathway Day of Surgery  Goal: Nutrition/Diet  Outcome: Progressing Towards Goal  Goal: Respiratory  Outcome: Progressing Towards Goal  Goal: *Tolerating diet  Outcome: Progressing Towards Goal

## 2020-11-11 NOTE — PROGRESS NOTES
Gynecology Progress Note    Patient doing well post-op day 1 from Procedure(s):  ABDOMINAL MYOMECTOMY without significant complaints. Pain controlled on current medication but very sore with movement. Voiding without difficulty. Patient is not passing flatus. Denies cp/sob/n/v now but had some nausea last night. Had dizziness with standing earlier. Duff out this am.    Vitals:  Blood pressure (!) 94/51, pulse 68, temperature 98.6 °F (37 °C), resp. rate 16, height 5' 7\" (1.702 m), weight 65.8 kg (145 lb), last menstrual period 10/21/2020, SpO2 97 %. Temp (24hrs), Av.4 °F (36.9 °C), Min:97.4 °F (36.3 °C), Max:99.7 °F (37.6 °C)        Exam:  Patient without distress. Heart regular rate and rhythm   Lungs CTA b/l               Abdomen soft,  Appropriately tender, positive bowel sounds, non-distended. Bandage dry and clean without surrounding erythema. Lower extremities are negative for swelling, cords, or tenderness. Lab/Data Review:  CBC:   Lab Results   Component Value Date/Time    WBC 8.1 2020 05:38 AM    HGB 11.7 2020 05:38 AM    HCT 34.4 (L) 2020 05:38 AM     2020 05:38 AM       Assessment and Plan:  Patient appears to be having uncomplicated post Procedure(s): POD#1 s/p  ABDOMINAL MYOMECTOMY course. Continue routine post-op care. Continue to work on ambulation today. Advance diet as tolerated.

## 2020-11-11 NOTE — PROGRESS NOTES
T.O.C:   Pt expected to d/c to home   Family to provide transport at d/c   Emergency Contact:     Reason for Admission:   Fibroid uterus                   RUR Score:    3%                 Plan for utilizing home health:    none      PCP: First and Last name:  Marie Rossi   Name of Practice:  [facility] Are you a current patient: Yes/No:  yes   Approximate date of last visit:  None; 1st visit in Dec.   Can you participate in a virtual visit with your PCP:  yes                    Current Advanced Directive/Advance Care Plan: no AMD, spouse is legal decision maker                         Transition of Care Plan:   Expected to d/c to home with family; follow up with [facility]                   CM met with pt at bedside; verified demographics, insurance, PCP and emergency contact. Pt expected to d/c to home with family to provide transport. Prior to admission pt ambulates independently; has adequate support. Pt does not have AMD, does not want to complete today. CM explained decision maker as next of kin, pt indicated she understood. [facility] information given to pt for future needs. No other issues at present. CM will continue to follow.     Noel Hi RN    Care Management Interventions  PCP Verified by CM: Yes(none yet; scheduled for December)  Palliative Care Criteria Met (RRAT>21 & CHF Dx)?: No  MyChart Signup: Yes(active)  Discharge Durable Medical Equipment: No  Physical Therapy Consult: No  Occupational Therapy Consult: No  Speech Therapy Consult: No  Current Support Network: Lives with Spouse  Confirm Follow Up Transport: Family  The Plan for Transition of Care is Related to the Following Treatment Goals : medically stable  The Patient and/or Patient Representative was Provided with a Choice of Provider and Agrees with the Discharge Plan?: No(n/a)  Freedom of Choice List was Provided with Basic Dialogue that Supports the Patient's Individualized Plan of Care/Goals, Treatment Preferences and Shares the Quality Data Associated with the Providers?: No(n/a)  Discharge Location  Discharge Placement: Home with family assistance    Merlin Cavalier, RN

## 2020-11-12 VITALS
HEART RATE: 54 BPM | TEMPERATURE: 97.3 F | SYSTOLIC BLOOD PRESSURE: 90 MMHG | RESPIRATION RATE: 16 BRPM | BODY MASS INDEX: 22.76 KG/M2 | HEIGHT: 67 IN | WEIGHT: 145 LBS | OXYGEN SATURATION: 100 % | DIASTOLIC BLOOD PRESSURE: 54 MMHG

## 2020-11-12 PROCEDURE — 74011250637 HC RX REV CODE- 250/637: Performed by: OBSTETRICS & GYNECOLOGY

## 2020-11-12 RX ORDER — IBUPROFEN 800 MG/1
800 TABLET ORAL
Qty: 30 TAB | Refills: 0 | OUTPATIENT
Start: 2020-11-12 | End: 2021-08-26

## 2020-11-12 RX ORDER — OXYCODONE AND ACETAMINOPHEN 5; 325 MG/1; MG/1
1 TABLET ORAL
Qty: 8 TAB | Refills: 0 | Status: SHIPPED | OUTPATIENT
Start: 2020-11-12 | End: 2020-11-15

## 2020-11-12 RX ADMIN — DOCUSATE SODIUM 100 MG: 100 CAPSULE, LIQUID FILLED ORAL at 08:19

## 2020-11-12 RX ADMIN — OXYCODONE HYDROCHLORIDE AND ACETAMINOPHEN 1 TABLET: 5; 325 TABLET ORAL at 08:19

## 2020-11-12 RX ADMIN — Medication 10 ML: at 06:00

## 2020-11-12 NOTE — PROGRESS NOTES
Bedside shift change report given to Ahsan Murray RN (oncoming nurse) by Froilan Hinds RN (offgoing nurse). Report included the following information SBAR, Kardex, Intake/Output and MAR.

## 2020-11-12 NOTE — PROGRESS NOTES
Gynecology Progress Note    Patient doing well post-op day 2 from Procedure(s):  ABDOMINAL MYOMECTOMY without significant complaints. Pain controlled on current medication. Voiding without difficulty. Patient is passing flatus. Denies cp/sob/n/v. Ambulating without problem. Vitals:  Blood pressure (!) 97/57, pulse 68, temperature 98.7 °F (37.1 °C), resp. rate 16, height 5' 7\" (1.702 m), weight 65.8 kg (145 lb), last menstrual period 10/21/2020, SpO2 98 %. Temp (24hrs), Av.5 °F (36.9 °C), Min:98.3 °F (36.8 °C), Max:98.7 °F (37.1 °C)        Exam:  Patient without distress. Heart regular rate and rhythm   Lungs CTA B/l               Abdomen soft,  Appropriately tender, positive bowel sounds, non-distended. Incision dry and clean without erythema. Lower extremities are negative for swelling, cords, or tenderness. Lab/Data Review:  no new labs    Assessment and Plan:  Patient appears to be having uncomplicated post Procedure(s): POD#2 s/p  ABDOMINAL MYOMECTOMY course. Continue routine post-op care. Discharge home today with follow up in 2 weeks or sooner prn.

## 2020-11-12 NOTE — PROGRESS NOTES
Bedside and Verbal shift change report given to Mayo Hope (oncoming nurse) by Danay Pompa (offgoing nurse). Report included the following information SBAR, Kardex, Intake/Output, MAR and Recent Results. 0935: Discharge medications reviewed with patient and spouse and appropriate educational materials and side effects teaching were provided. I have reviewed discharge instructions with the patient and spouse. The patient and spouse verbalized understanding.       3936: pt left via wheelchair to discharge

## 2020-11-12 NOTE — DISCHARGE SUMMARY
Discharge Summary     Name: Zach Ragland MRN: [id number]  SSN: [id number]    YOB: 1990  Age: 26 y.o. Sex: female      Allergies: Shellfish derived    Admit Date: 11/10/2020    Discharge Date: 11/12/2020      Admitting Physician: Jayesh Preston DO     * Admission Diagnoses: Fibroids    * Discharge Diagnoses:   Hospital Problems as of 11/12/2020 Date Reviewed: 11/10/2020          Codes Class Noted - Resolved POA    Fibroid uterus ICD-10-CM: D25.9  ICD-9-CM: 218.9  11/10/2020 - Present Unknown               * Procedures: Abdominal myomectomy    * Discharge Condition: [facility] Course: Normal hospital course for this procedure. Significant Diagnostic Studies: No results found for this or any previous visit (from the past 24 hour(s)). * Disposition: Home    Discharge Medications:   Current Discharge Medication List      START taking these medications    Details   ibuprofen (MOTRIN) 800 mg tablet Take 1 Tab by mouth every eight (8) hours as needed for Pain. Qty: 30 Tab, Refills: 0      oxyCODONE-acetaminophen (PERCOCET) 5-325 mg per tablet Take 1 Tab by mouth every six (6) hours as needed for Pain for up to 3 days. Max Daily Amount: 4 Tabs. Qty: 8 Tab, Refills: 0    Associated Diagnoses: Postoperative pain         CONTINUE these medications which have NOT CHANGED    Details   CHOLINE-10 PO Take  by mouth daily. PNV Comb #2-Iron-Omega 3-FA 50-9-836-200 mg cmpk Take  by mouth. omega 3-dha-epa-fish oil (Fish Oil) 100-160-1,000 mg cap Take  by mouth. * Follow-up Care/Patient Instructions: Activity: No sex, douching, or tampons for 6 weeks or as directed by your physician. No heavy lifting for 6 weeks. No driving while taking pain medication.   Diet: Resume pre-hospital diet  Wound Care: Keep wound clean and dry    Follow-up Information     Follow up With Specialties Details Why Contact Marlon Duran, [address] Gynecology, Gynecology, Obstetrics In [address]  [phone]

## 2020-11-12 NOTE — DISCHARGE INSTRUCTIONS
You were given a medication called Sugammadex during your procedure to accelerate recovery from the muscle relaxant used during anesthesia. Birth control (including pills, rings, implants, and other devices) may not work for up to 7 days. You must use a second form of birth control, such as a condom, a diaphragm, or contraceptive foam or jelly, for up to 7 days. PATIENT DISCHARGE INSTRUCTIONS      PATIENT DISCHARGE INSTRUCTIONS    Darcy Morataya / [id number] : 1990    Admitted 11/10/2020 Discharged: 2020       · It is important that you take the medication exactly as they are prescribed. · Keep your medication in the bottles provided by the pharmacist and keep a list of the medication names, dosages, and times to be taken in your wallet. · Do not take other medications without consulting your doctor. What to do at Home    Recommended Diet: Regular Diet    Recommended Activity: no heavy lifting >10 pounds, no sex, douching, tampons, bath, pool x 4-6 weeks. No driving x 2 weeks and while taking narcotics    If you experience any of the following symptoms excessive pain, nausea/vomiting, excessive vaginal bleeding, temp >100.6, please follow up with Dr Jag Bernal.

## 2020-11-30 NOTE — PROGRESS NOTES
HISTORY OF PRESENT ILLNESS  Jaelyn Ulrich is a 26 y.o. female. HPI   New patient to our practice. Recently had intrauterine fibroid removed by Dr. Andrew Vera. Recovering well. Plans to try to conceive starting in May 2021    Past Medical History:   Diagnosis Date    Adverse effect of anesthesia     VOMITING AFTER WISDOM TEETH EXTRACTION     Past Surgical History:   Procedure Laterality Date    HX GYN      fibroidectomy    HX HEENT      WISDOM TEETH       Current Outpatient Medications:     CHOLINE-10 PO, Take  by mouth daily. , Disp: , Rfl:     PNV Comb #2-Iron-Omega 3-FA 50-9-611-200 mg cmpk, Take  by mouth., Disp: , Rfl:     omega 3-dha-epa-fish oil (Fish Oil) 100-160-1,000 mg cap, Take  by mouth., Disp: , Rfl:     ibuprofen (MOTRIN) 800 mg tablet, Take 1 Tab by mouth every eight (8) hours as needed for Pain., Disp: 30 Tab, Rfl: 0    Allergies   Allergen Reactions    Shellfish Derived Nausea and Vomiting     Family History   Problem Relation Age of Onset    No Known Problems Mother     Cancer Father         BLADDER    COPD Father     No Known Problems Brother     Anesth Problems Neg Hx      Social hx reviewed and updated in chart. Review of Systems   Constitutional: Negative. HENT: Negative. Eyes: Negative. Respiratory: Negative. Cardiovascular: Negative. Gastrointestinal: Negative. Genitourinary: Negative. Musculoskeletal: Negative. Skin: Negative. Neurological: Negative. Endo/Heme/Allergies: Positive for environmental allergies (mild seasonal). Negative for polydipsia. Does not bruise/bleed easily. Psychiatric/Behavioral: Negative. Walking for exercise  Designs online training. Moved her approx 1 year ago.       Visit Vitals  /66 (BP 1 Location: Left arm, BP Patient Position: Sitting)   Pulse 66   Temp 98.4 °F (36.9 °C) (Temporal)   Resp 16   Ht 5' 7\" (1.702 m)   Wt 142 lb (64.4 kg)   SpO2 100%   BMI 22.24 kg/m²     Physical Exam  Vitals signs reviewed. Constitutional:       Appearance: Normal appearance. She is normal weight. HENT:      Head: Normocephalic and atraumatic. Right Ear: Tympanic membrane, ear canal and external ear normal.      Left Ear: Tympanic membrane, ear canal and external ear normal.      Nose: Nose normal.      Mouth/Throat:      Mouth: Mucous membranes are moist.      Pharynx: Oropharynx is clear. Eyes:      Extraocular Movements: Extraocular movements intact. Conjunctiva/sclera: Conjunctivae normal.      Pupils: Pupils are equal, round, and reactive to light. Neck:      Musculoskeletal: Neck supple. Vascular: No carotid bruit. Comments: No TM  Cardiovascular:      Rate and Rhythm: Normal rate and regular rhythm. Pulses: Normal pulses. Heart sounds: Normal heart sounds. Pulmonary:      Effort: Pulmonary effort is normal.      Breath sounds: Normal breath sounds. Abdominal:      General: Abdomen is flat. Bowel sounds are normal. There is no distension. Palpations: Abdomen is soft. There is no mass. Tenderness: There is no abdominal tenderness. Musculoskeletal: Normal range of motion. Lymphadenopathy:      Cervical: No cervical adenopathy. Neurological:      Mental Status: She is alert and oriented to person, place, and time. ASSESSMENT and PLAN  New patient PE  Healthy - utd GYN and flu vaccine. Continue regular exercise. maintain weight. No orders of the defined types were placed in this encounter.

## 2020-12-01 ENCOUNTER — OFFICE VISIT (OUTPATIENT)
Dept: INTERNAL MEDICINE CLINIC | Age: 30
End: 2020-12-01
Payer: COMMERCIAL

## 2020-12-01 VITALS
HEIGHT: 67 IN | TEMPERATURE: 98.4 F | HEART RATE: 66 BPM | SYSTOLIC BLOOD PRESSURE: 114 MMHG | DIASTOLIC BLOOD PRESSURE: 66 MMHG | WEIGHT: 142 LBS | BODY MASS INDEX: 22.29 KG/M2 | RESPIRATION RATE: 16 BRPM | OXYGEN SATURATION: 100 %

## 2020-12-01 DIAGNOSIS — Z00.00 ROUTINE GENERAL MEDICAL EXAMINATION AT A HEALTH CARE FACILITY: Primary | ICD-10-CM

## 2020-12-01 PROCEDURE — 99385 PREV VISIT NEW AGE 18-39: CPT | Performed by: INTERNAL MEDICINE

## 2020-12-01 NOTE — PROGRESS NOTES
Chief Complaint   Patient presents with   [address] [place] [address]       1. Have you been to the ER, urgent care clinic since your last visit? Hospitalized since your last visit? No    2. Have you seen or consulted any other health care providers outside of the [address] [place] [address] since your last visit? Include any pap smears or colon screening.  No

## 2021-08-26 ENCOUNTER — APPOINTMENT (OUTPATIENT)
Dept: ULTRASOUND IMAGING | Age: 31
End: 2021-08-26
Attending: EMERGENCY MEDICINE
Payer: COMMERCIAL

## 2021-08-26 ENCOUNTER — HOSPITAL ENCOUNTER (EMERGENCY)
Age: 31
Discharge: HOME | End: 2021-08-26
Attending: EMERGENCY MEDICINE
Payer: COMMERCIAL

## 2021-08-26 VITALS
DIASTOLIC BLOOD PRESSURE: 64 MMHG | BODY MASS INDEX: 21.27 KG/M2 | SYSTOLIC BLOOD PRESSURE: 106 MMHG | TEMPERATURE: 98.3 F | HEART RATE: 70 BPM | WEIGHT: 135.5 LBS | HEIGHT: 67 IN | RESPIRATION RATE: 17 BRPM | OXYGEN SATURATION: 98 %

## 2021-08-26 DIAGNOSIS — O03.9 MISCARRIAGE: Primary | ICD-10-CM

## 2021-08-26 LAB
ABO + RH BLD: NORMAL
ALBUMIN SERPL-MCNC: 4 G/DL (ref 3.5–5)
ALBUMIN/GLOB SERPL: 1.4 {RATIO} (ref 1.1–2.2)
ALP SERPL-CCNC: 43 U/L (ref 45–117)
ALT SERPL-CCNC: 27 U/L (ref 12–78)
ANION GAP SERPL CALC-SCNC: 8 MMOL/L (ref 5–15)
APTT PPP: 22.2 SEC (ref 22.1–31)
AST SERPL-CCNC: 12 U/L (ref 15–37)
BASOPHILS # BLD: 0.1 K/UL (ref 0–0.1)
BASOPHILS NFR BLD: 0 % (ref 0–1)
BILIRUB SERPL-MCNC: 0.7 MG/DL (ref 0.2–1)
BLOOD GROUP ANTIBODIES SERPL: NORMAL
BUN SERPL-MCNC: 16 MG/DL (ref 6–20)
BUN/CREAT SERPL: 22 (ref 12–20)
CALCIUM SERPL-MCNC: 8.4 MG/DL (ref 8.5–10.1)
CHLORIDE SERPL-SCNC: 109 MMOL/L (ref 97–108)
CO2 SERPL-SCNC: 21 MMOL/L (ref 21–32)
COMMENT, HOLDF: NORMAL
CREAT SERPL-MCNC: 0.72 MG/DL (ref 0.55–1.02)
DIFFERENTIAL METHOD BLD: ABNORMAL
EOSINOPHIL # BLD: 0.2 K/UL (ref 0–0.4)
EOSINOPHIL NFR BLD: 1 % (ref 0–7)
ERYTHROCYTE [DISTWIDTH] IN BLOOD BY AUTOMATED COUNT: 11.8 % (ref 11.5–14.5)
GLOBULIN SER CALC-MCNC: 2.9 G/DL (ref 2–4)
GLUCOSE SERPL-MCNC: 147 MG/DL (ref 65–100)
HCG SERPL-ACNC: 254 MIU/ML (ref 0–6)
HCT VFR BLD AUTO: 38.4 % (ref 35–47)
HGB BLD-MCNC: 13.3 G/DL (ref 11.5–16)
IMM GRANULOCYTES # BLD AUTO: 0.1 K/UL (ref 0–0.04)
IMM GRANULOCYTES NFR BLD AUTO: 0 % (ref 0–0.5)
INR PPP: 1.1 (ref 0.9–1.1)
LYMPHOCYTES # BLD: 4 K/UL (ref 0.8–3.5)
LYMPHOCYTES NFR BLD: 34 % (ref 12–49)
MCH RBC QN AUTO: 32.6 PG (ref 26–34)
MCHC RBC AUTO-ENTMCNC: 34.6 G/DL (ref 30–36.5)
MCV RBC AUTO: 94.1 FL (ref 80–99)
MONOCYTES # BLD: 0.8 K/UL (ref 0–1)
MONOCYTES NFR BLD: 7 % (ref 5–13)
NEUTS SEG # BLD: 7 K/UL (ref 1.8–8)
NEUTS SEG NFR BLD: 58 % (ref 32–75)
NRBC # BLD: 0 K/UL (ref 0–0.01)
NRBC BLD-RTO: 0 PER 100 WBC
PLATELET # BLD AUTO: 303 K/UL (ref 150–400)
PMV BLD AUTO: 10.3 FL (ref 8.9–12.9)
POTASSIUM SERPL-SCNC: 3.4 MMOL/L (ref 3.5–5.1)
PROT SERPL-MCNC: 6.9 G/DL (ref 6.4–8.2)
PROTHROMBIN TIME: 11.3 SEC (ref 9–11.1)
RBC # BLD AUTO: 4.08 M/UL (ref 3.8–5.2)
SAMPLES BEING HELD,HOLD: NORMAL
SODIUM SERPL-SCNC: 138 MMOL/L (ref 136–145)
SPECIMEN EXP DATE BLD: NORMAL
THERAPEUTIC RANGE,PTTT: NORMAL SECS (ref 58–77)
WBC # BLD AUTO: 12 K/UL (ref 3.6–11)

## 2021-08-26 PROCEDURE — 85610 PROTHROMBIN TIME: CPT

## 2021-08-26 PROCEDURE — 74011250636 HC RX REV CODE- 250/636: Performed by: EMERGENCY MEDICINE

## 2021-08-26 PROCEDURE — 80053 COMPREHEN METABOLIC PANEL: CPT

## 2021-08-26 PROCEDURE — 76817 TRANSVAGINAL US OBSTETRIC: CPT

## 2021-08-26 PROCEDURE — 86901 BLOOD TYPING SEROLOGIC RH(D): CPT

## 2021-08-26 PROCEDURE — 99285 EMERGENCY DEPT VISIT HI MDM: CPT

## 2021-08-26 PROCEDURE — 85730 THROMBOPLASTIN TIME PARTIAL: CPT

## 2021-08-26 PROCEDURE — 85025 COMPLETE CBC W/AUTO DIFF WBC: CPT

## 2021-08-26 PROCEDURE — 76801 OB US < 14 WKS SINGLE FETUS: CPT

## 2021-08-26 PROCEDURE — 36415 COLL VENOUS BLD VENIPUNCTURE: CPT

## 2021-08-26 PROCEDURE — 74011250637 HC RX REV CODE- 250/637: Performed by: EMERGENCY MEDICINE

## 2021-08-26 PROCEDURE — 84702 CHORIONIC GONADOTROPIN TEST: CPT

## 2021-08-26 PROCEDURE — 96361 HYDRATE IV INFUSION ADD-ON: CPT

## 2021-08-26 PROCEDURE — 96360 HYDRATION IV INFUSION INIT: CPT

## 2021-08-26 RX ORDER — MISOPROSTOL 200 UG/1
400 TABLET ORAL
Status: COMPLETED | OUTPATIENT
Start: 2021-08-26 | End: 2021-08-26

## 2021-08-26 RX ORDER — MISOPROSTOL 200 UG/1
400 TABLET ORAL ONCE
Qty: 2 TABLET | Refills: 0 | Status: SHIPPED | OUTPATIENT
Start: 2021-08-26 | End: 2021-08-26

## 2021-08-26 RX ORDER — IBUPROFEN 600 MG/1
600 TABLET ORAL
Status: COMPLETED | OUTPATIENT
Start: 2021-08-26 | End: 2021-08-26

## 2021-08-26 RX ORDER — IBUPROFEN 600 MG/1
600 TABLET ORAL
Qty: 20 TABLET | Refills: 0 | Status: SHIPPED | OUTPATIENT
Start: 2021-08-26 | End: 2022-06-04

## 2021-08-26 RX ORDER — MISOPROSTOL 200 UG/1
400 TABLET ORAL ONCE
Qty: 2 TABLET | Refills: 0 | Status: SHIPPED | OUTPATIENT
Start: 2021-08-26 | End: 2021-08-26 | Stop reason: SDUPTHER

## 2021-08-26 RX ADMIN — SODIUM CHLORIDE 1000 ML: 900 INJECTION, SOLUTION INTRAVENOUS at 07:23

## 2021-08-26 RX ADMIN — IBUPROFEN 600 MG: 600 TABLET, FILM COATED ORAL at 12:50

## 2021-08-26 RX ADMIN — SODIUM CHLORIDE 1000 ML: 9 INJECTION, SOLUTION INTRAVENOUS at 10:34

## 2021-08-26 RX ADMIN — MISOPROSTOL 400 MCG: 200 TABLET ORAL at 10:27

## 2021-08-26 NOTE — ED PROVIDER NOTES
HPI     Please note that this dictation was completed with Akella, the computer voice recognition software. Quite often unanticipated grammatical, syntax, homophones, and other interpretive errors are inadvertently transcribed by the computer software. Please disregard these errors. Please excuse any errors that have escaped final proofreading. 23-year-old female  1 para 0 at 6 weeks here with vaginal bleeding stating she is having a miscarriage. She began having bleeding on  and then progressively got worse through the week until today. She saw her OB yesterday and they noticed a yolk sac on ultrasound 5 weeks 2 days intrauterine. She is unable to quantify the amount of bleeding as she spent a lot of time on the toilet. She reports not seeing any tissue pass. She states it was slowing down until 4 am and then significantly increased bleding which persisted since then. Lab work was drawn yesterday and the patient does not know the results as they have not returned yet. Complains of moderate severe abdominal cramping. Denies fevers, chills or other complaints. Past Medical History:   Diagnosis Date    Adverse effect of anesthesia     VOMITING AFTER WISDOM TEETH EXTRACTION       Past Surgical History:   Procedure Laterality Date    HX GYN      fibroidectomy    HX HEENT      WISDOM TEETH         Family History:   Problem Relation Age of Onset    No Known Problems Mother     Cancer Father         BLADDER    COPD Father     No Known Problems Brother     Anesth Problems Neg Hx        Social History     Socioeconomic History    Marital status:      Spouse name: Not on file    Number of children: Not on file    Years of education: Not on file    Highest education level: Not on file   Occupational History    Not on file   Tobacco Use    Smoking status: Never Smoker    Smokeless tobacco: Never Used   Substance and Sexual Activity    Alcohol use:  Yes     Alcohol/week: 4.0 standard drinks     Types: 4 Glasses of wine per week    Drug use: Never    Sexual activity: Yes     Partners: Male     Birth control/protection: Condom   Other Topics Concern    Not on file   Social History Narrative    Not on file     Social Determinants of Health     Financial Resource Strain:     Difficulty of Paying Living Expenses:    Food Insecurity:     Worried About Running Out of Food in the Last Year:     [address] in the Last Year:    Transportation Needs:     Lack of Transportation (Medical):  Lack of Transportation (Non-Medical):    Physical Activity:     Days of Exercise per Week:     Minutes of Exercise per Session:    Stress:     Feeling of Stress :    Social Connections:     Frequency of Communication with Friends and Family:     Frequency of Social Gatherings with Friends and Family:     Attends Sikhism Services:     Active Member of Clubs or Organizations:     Attends Club or Organization Meetings:     Marital Status:    Intimate Partner Violence:     Fear of Current or Ex-Partner:     Emotionally Abused:     Physically Abused:     Sexually Abused: ALLERGIES: Shellfish derived    Review of Systems   Constitutional: Negative for fever. Respiratory: Negative for chest tightness and shortness of breath. Gastrointestinal: Negative for abdominal pain. Genitourinary: Positive for pelvic pain and vaginal bleeding. All other systems reviewed and are negative. Vitals:    08/26/21 1030 08/26/21 1100 08/26/21 1130 08/26/21 1200   BP: (!) 96/53 100/64 (!) 96/54 (!) 104/56   Pulse: (!) 46 (!) 47 (!) 48 (!) 59   Resp: 17 13 16 17   Temp:       SpO2: 100% 100% 100% 99%   Weight:       Height:                Physical Exam  Vitals and nursing note reviewed. Constitutional:       Appearance: She is well-developed. HENT:      Head: Normocephalic and atraumatic. Nose: No congestion or rhinorrhea.       Mouth/Throat:      Pharynx: No oropharyngeal exudate. Eyes:      General: No scleral icterus. Right eye: No discharge. Left eye: No discharge. Conjunctiva/sclera: Conjunctivae normal.      Pupils: Pupils are equal, round, and reactive to light. Cardiovascular:      Rate and Rhythm: Normal rate and regular rhythm. Heart sounds: Normal heart sounds. No murmur heard. No friction rub. No gallop. Pulmonary:      Effort: Pulmonary effort is normal. No respiratory distress. Breath sounds: Normal breath sounds. No wheezing or rales. Abdominal:      General: Bowel sounds are normal. There is no distension. Palpations: Abdomen is soft. Tenderness: There is no abdominal tenderness. There is no guarding. Musculoskeletal:         General: No tenderness. Normal range of motion. Cervical back: Normal range of motion and neck supple. Lymphadenopathy:      Cervical: No cervical adenopathy. Skin:     General: Skin is warm and dry. Coloration: Skin is not pale. Findings: No rash. Neurological:      Mental Status: She is alert and oriented to person, place, and time. Cranial Nerves: No cranial nerve deficit. Coordination: Coordination normal.          [facility]  ED Course as of Aug 26 1247   Thu Aug 26, 2021   0802 Normal.  Updated pt that she is not anemic. Await other labs and US then will call OB. HGB: 13.3 [RG]      ED Course User Index  [RG] Vijay Mtz MD     57-year-old female  1 here with vaginal bleeding who was reportedly 6 weeks pregnant. Yolk sac seen intrauterine on ultrasound per patient yesterday. Peak blood pressure 104/59. Initial blood pressure hypotensive. Will do quick external exam with female chaperone and then call OB. Check CBC, coags, type and screen, quant. Procedures      10:02 AM  With Dr. Raeann Singh, OB. Reviewed history, labs and results. She believes that she likely passed the miscarriage at 4 AM which led to the increased bleeding.   There is no yolk sac currently seen on ultrasound. She recommends misoprostol 400 mcg orally now and may repeat in 12 hours if bleeding still significant. She can follow-up with the office next week or tomorrow with Dr. Abdoul White if needed. 12:47 PM  Bleeding is slowing now per pt.      12:47 PM  Patient's results have been reviewed with them. Patient and/or family have verbally conveyed their understanding and agreement of the patient's signs, symptoms, diagnosis, treatment and prognosis and additionally agree to follow up as recommended or return to the Emergency Room should their condition change prior to follow-up. Discharge instructions have also been provided to the patient with some educational information regarding their diagnosis as well a list of reasons why they would want to return to the ER prior to their follow-up appointment should their condition change. Recent Results (from the past 24 hour(s))   CBC WITH AUTOMATED DIFF    Collection Time: 08/26/21  7:09 AM   Result Value Ref Range    WBC 12.0 (H) 3.6 - 11.0 K/uL    RBC 4.08 3.80 - 5.20 M/uL    HGB 13.3 11.5 - 16.0 g/dL    HCT 38.4 35.0 - 47.0 %    MCV 94.1 80.0 - 99.0 FL    MCH 32.6 26.0 - 34.0 PG    MCHC 34.6 30.0 - 36.5 g/dL    RDW 11.8 11.5 - 14.5 %    PLATELET 499 240 - 637 K/uL    MPV 10.3 8.9 - 12.9 FL    NRBC 0.0 0  WBC    ABSOLUTE NRBC 0.00 0.00 - 0.01 K/uL    NEUTROPHILS 58 32 - 75 %    LYMPHOCYTES 34 12 - 49 %    MONOCYTES 7 5 - 13 %    EOSINOPHILS 1 0 - 7 %    BASOPHILS 0 0 - 1 %    IMMATURE GRANULOCYTES 0 0.0 - 0.5 %    ABS. NEUTROPHILS 7.0 1.8 - 8.0 K/UL    ABS. LYMPHOCYTES 4.0 (H) 0.8 - 3.5 K/UL    ABS. MONOCYTES 0.8 0.0 - 1.0 K/UL    ABS. EOSINOPHILS 0.2 0.0 - 0.4 K/UL    ABS. BASOPHILS 0.1 0.0 - 0.1 K/UL    ABS. IMM.  GRANS. 0.1 (H) 0.00 - 0.04 K/UL    DF AUTOMATED     METABOLIC PANEL, COMPREHENSIVE    Collection Time: 08/26/21  7:09 AM   Result Value Ref Range    Sodium 138 136 - 145 mmol/L    Potassium 3.4 (L) 3.5 - 5.1 mmol/L    Chloride 109 (H) 97 - 108 mmol/L    CO2 21 21 - 32 mmol/L    Anion gap 8 5 - 15 mmol/L    Glucose 147 (H) 65 - 100 mg/dL    BUN 16 6 - 20 MG/DL    Creatinine 0.72 0.55 - 1.02 MG/DL    BUN/Creatinine ratio 22 (H) 12 - 20      GFR est AA >60 >60 ml/min/1.73m2    GFR est non-AA >60 >60 ml/min/1.73m2    Calcium 8.4 (L) 8.5 - 10.1 MG/DL    Bilirubin, total 0.7 0.2 - 1.0 MG/DL    ALT (SGPT) 27 12 - 78 U/L    AST (SGOT) 12 (L) 15 - 37 U/L    Alk. phosphatase 43 (L) 45 - 117 U/L    Protein, total 6.9 6.4 - 8.2 g/dL    Albumin 4.0 3.5 - 5.0 g/dL    Globulin 2.9 2.0 - 4.0 g/dL    A-G Ratio 1.4 1.1 - 2.2     PROTHROMBIN TIME + INR    Collection Time: 21  7:09 AM   Result Value Ref Range    INR 1.1 0.9 - 1.1      Prothrombin time 11.3 (H) 9.0 - 11.1 sec   PTT    Collection Time: 21  7:09 AM   Result Value Ref Range    aPTT 22.2 22.1 - 31.0 sec    aPTT, therapeutic range     58.0 - 77.0 SECS   BETA HCG, QT    Collection Time: 21  7:09 AM   Result Value Ref Range    Beta HCG,  (H) 0 - 6 MIU/ML   TYPE & SCREEN    Collection Time: 21  7:10 AM   Result Value Ref Range    Crossmatch Expiration 2021,2359     ABO/Rh(D) A POSITIVE     Antibody screen NEG    SAMPLES BEING HELD    Collection Time: 21  7:10 AM   Result Value Ref Range    SAMPLES BEING HELD 1blu,1red     COMMENT        Add-on orders for these samples will be processed based on acceptable specimen integrity and analyte stability, which may vary by analyte. US UTS TRANSVAGINAL OB    Result Date: 2021  EXAM: US OB EVAL SINGLE GESTATION LESS THAN 14 WEEKS INDICATION: Pregnant. Vaginal bleeding for 4 days. COMPARISON: None. TECHNIQUE: Transabdominal and transvaginal ultrasound of the pelvis. hC FINDINGS:  Transabdominal: The uterus measures: 9.0 x 4.8 x 6.1 cm. The endometrium measures 7 mm in thickness. No endometrial gestational sac is identified. A possible anterior fibroid measures 3.3 x 2.4 x 3.3 cm.  The ovaries are not seen due to bowel gas. Transvaginal: The uterus measures: 8.8 x 4.4 x 5.6 cm. The endometrium measures 6 mm in thickness. No endometrial gestational sac is identified. A posterior fibroid measures 1.7 x 1.6 x 1.7 cm. The cervix is closed and measures 3.2 cm in length. The right ovary measures 3.1 x 2.0 x 3.6 cm and the left ovary measures 2.3 x 1.1 x 2.4 cm. They are normal in appearance with normal flow. There is no free fluid. 1. No endometrial gestational sac is identified. Findings are concerning for fetal demise in the setting of a previously documented intrauterine pregnancy. Follow-up hCG and ultrasound are recommended as clinically warranted. 2. Two possible uterine fibroids measuring 3.3 cm and 1.7 cm. 3. Normal ovaries. US PREG UTS < 14 WKS SNGL    Result Date: 2021  EXAM: US OB EVAL SINGLE GESTATION LESS THAN 14 WEEKS INDICATION: Pregnant. Vaginal bleeding for 4 days. COMPARISON: None. TECHNIQUE: Transabdominal and transvaginal ultrasound of the pelvis. hC FINDINGS:  Transabdominal: The uterus measures: 9.0 x 4.8 x 6.1 cm. The endometrium measures 7 mm in thickness. No endometrial gestational sac is identified. A possible anterior fibroid measures 3.3 x 2.4 x 3.3 cm. The ovaries are not seen due to bowel gas. Transvaginal: The uterus measures: 8.8 x 4.4 x 5.6 cm. The endometrium measures 6 mm in thickness. No endometrial gestational sac is identified. A posterior fibroid measures 1.7 x 1.6 x 1.7 cm. The cervix is closed and measures 3.2 cm in length. The right ovary measures 3.1 x 2.0 x 3.6 cm and the left ovary measures 2.3 x 1.1 x 2.4 cm. They are normal in appearance with normal flow. There is no free fluid. 1. No endometrial gestational sac is identified. Findings are concerning for fetal demise in the setting of a previously documented intrauterine pregnancy. Follow-up hCG and ultrasound are recommended as clinically warranted.  2. Two possible uterine fibroids measuring 3.3 cm and 1.7 cm. 3. Normal ovaries.

## 2021-08-26 NOTE — ED NOTES
Bedside and Verbal shift change report given to FAISAL Babcock (oncoming nurse) by Ralph Frazier (offgoing nurse). Report included the following information SBAR, Kardex and ED Summary.

## 2021-08-26 NOTE — ED TRIAGE NOTES
Patient arrives to the ED with c/o vaginal bleeding which started on monday, patient states she is 6 weeks pregnant, dark red blood with clots.

## 2021-09-03 ENCOUNTER — OFFICE VISIT (OUTPATIENT)
Dept: INTERNAL MEDICINE CLINIC | Age: 31
End: 2021-09-03
Payer: COMMERCIAL

## 2021-09-03 VITALS
HEIGHT: 67 IN | DIASTOLIC BLOOD PRESSURE: 61 MMHG | RESPIRATION RATE: 18 BRPM | BODY MASS INDEX: 21.66 KG/M2 | TEMPERATURE: 98.2 F | OXYGEN SATURATION: 100 % | WEIGHT: 138 LBS | SYSTOLIC BLOOD PRESSURE: 109 MMHG | HEART RATE: 80 BPM

## 2021-09-03 DIAGNOSIS — Z00.00 ROUTINE GENERAL MEDICAL EXAMINATION AT A HEALTH CARE FACILITY: Primary | ICD-10-CM

## 2021-09-03 DIAGNOSIS — Z83.49 FH: THYROID DISEASE: ICD-10-CM

## 2021-09-03 PROCEDURE — 99395 PREV VISIT EST AGE 18-39: CPT | Performed by: INTERNAL MEDICINE

## 2021-09-03 NOTE — PROGRESS NOTES
HISTORY OF PRESENT ILLNESS  Shanae Donovan is a 31 y.o. female. HPI   Seen in ER 8/26 for miscarriage. Low BP in ER as well. Current Outpatient Medications   Medication Instructions    CHOLINE-10 PO Oral, DAILY    ibuprofen (MOTRIN) 600 mg, Oral, EVERY 6 HOURS AS NEEDED    omega 3-dha-epa-fish oil (Fish Oil) 100-160-1,000 mg cap Oral    PNV Comb #2-Iron-Omega 3-FA 14-2-416-200 mg cmpk Oral       There were no vitals taken for this visit.       ROS    Physical Exam    ASSESSMENT and PLAN  {ASSESSMENT/PLAN:07144}

## 2021-09-03 NOTE — PROGRESS NOTES
Subjective:   31 y.o. female for Well Woman Check. Her gyne and breast care is done elsewhere by her Ob-Gyne physician. Patient Active Problem List    Diagnosis Date Noted    Fibroid uterus 11/10/2020     Current Outpatient Medications   Medication Sig Dispense Refill    ibuprofen (MOTRIN) 600 mg tablet Take 1 Tablet by mouth every six (6) hours as needed for Pain. 20 Tablet 0    CHOLINE-10 PO Take  by mouth daily.  PNV Comb #2-Iron-Omega 3-FA 89-0-541-200 mg cmpk Take  by mouth.  omega 3-dha-epa-fish oil (Fish Oil) 100-160-1,000 mg cap Take  by mouth. Allergies   Allergen Reactions    Shellfish Derived Nausea and Vomiting     Past Medical History:   Diagnosis Date    Adverse effect of anesthesia     VOMITING AFTER WISDOM TEETH EXTRACTION     Past Surgical History:   Procedure Laterality Date    HX GYN      fibroidectomy    HX HEENT      WISDOM TEETH     Family History   Problem Relation Age of Onset    No Known Problems Mother     Cancer Father         BLADDER    COPD Father     No Known Problems Brother     Anesth Problems Neg Hx      Social History     Tobacco Use    Smoking status: Never Smoker    Smokeless tobacco: Never Used   Substance Use Topics    Alcohol use: Yes     Alcohol/week: 4.0 standard drinks     Types: 4 Glasses of wine per week        Lab Results   Component Value Date/Time    WBC 12.0 (H) 08/26/2021 07:09 AM    HGB 13.3 08/26/2021 07:09 AM    HCT 38.4 08/26/2021 07:09 AM    PLATELET 418 59/71/5698 07:09 AM    MCV 94.1 08/26/2021 07:09 AM     Lab Results   Component Value Date/Time    Glucose 147 (H) 08/26/2021 07:09 AM    Creatinine 0.72 08/26/2021 07:09 AM      No results found for: CHOL, CHOLPOCT, HDL, LDL, LDLC, LDLCPOC, LDLCEXT, TRIGL, TGLPOCT, CHHD, CHHDX  Lab Results   Component Value Date/Time    ALT (SGPT) 27 08/26/2021 07:09 AM    Alk.  phosphatase 43 (L) 08/26/2021 07:09 AM    Bilirubin, total 0.7 08/26/2021 07:09 AM    Albumin 4.0 08/26/2021 07:09 AM Protein, total 6.9 08/26/2021 07:09 AM    INR 1.1 08/26/2021 07:09 AM    Prothrombin time 11.3 (H) 08/26/2021 07:09 AM    PLATELET 065 69/87/5222 07:09 AM     Lab Results   Component Value Date/Time    GFR est non-AA >60 08/26/2021 07:09 AM    GFR est AA >60 08/26/2021 07:09 AM    Creatinine 0.72 08/26/2021 07:09 AM    BUN 16 08/26/2021 07:09 AM    Sodium 138 08/26/2021 07:09 AM    Potassium 3.4 (L) 08/26/2021 07:09 AM    Chloride 109 (H) 08/26/2021 07:09 AM    CO2 21 08/26/2021 07:09 AM       Specific concerns today:   Seen in ER last week with miscarraige at 7 weeks. Had fibroid surgery last year. Dad and pGF hypothyroid. Had recent TSH check, was 3.2 through gyn. Wonders about repeating. Review of Systems  Constitutional: negative  Eyes: negative except for contacts/glasses  Ears, nose, mouth, throat, and face: negative  Respiratory: negative  Cardiovascular: negative  Gastrointestinal: negative  Genitourinary:negative  Integument/breast: negative  Hematologic/lymphatic: negative  Musculoskeletal:negative except for occ left knee pain   Neurological: negative  Behavioral/Psych: negative  Endocrine: negative  Allergic/Immunologic: negative    Objective:   Blood pressure 109/61, pulse 80, temperature 98.2 °F (36.8 °C), temperature source Temporal, resp. rate 18, height 5' 7\" (1.702 m), weight 138 lb (62.6 kg), last menstrual period 10/21/2020, SpO2 100 %.    Physical Examination:   General appearance - alert, well appearing, and in no distress and oriented to person, place, and time  Mental status - alert, oriented to person, place, and time, normal mood, behavior, speech, dress, motor activity, and thought processes  Eyes - pupils equal and reactive, extraocular eye movements intact  Ears - bilateral TM's and external ear canals normal  Nose - normal and patent, no erythema, discharge or polyps  Mouth - mucous membranes moist, pharynx normal without lesions  Neck - supple, no significant adenopathy, carotids upstroke normal bilaterally, no bruits, thyroid exam: thyroid is normal in size without nodules or tenderness  Lymphatics - no palpable lymphadenopathy, no hepatosplenomegaly  Chest - clear to auscultation, no wheezes, rales or rhonchi, symmetric air entry  Heart - normal rate, regular rhythm, normal S1, S2, no murmurs, rubs, clicks or gallops  Abdomen - soft, nontender, nondistended, no masses or organomegaly  Back exam - full range of motion, no tenderness, palpable spasm or pain on motion  Neurological - alert, oriented, normal speech, no focal findings or movement disorder noted  Musculoskeletal - no joint tenderness, deformity or swelling  Extremities - peripheral pulses normal, no pedal edema, no clubbing or cyanosis  Skin - normal coloration and turgor, no rashes, no suspicious skin lesions noted     Assessment/Plan:   26yo female here for PE  Healthy  Maintain weight continue exercise.     call if any problems  Orders Placed This Encounter    TN IMMUNIZ ADMIN,1 SINGLE/COMB VAC/TOXOID    TETANUS, DIPHTHERIA TOXOIDS AND ACELLULAR PERTUSSIS VACCINE (TDAP), IN INDIVIDS. >=7, IM    VITAMIN D, 25 HYDROXY    TSH 3RD GENERATION    T4, FREE    T3, FREE

## 2021-09-03 NOTE — PROGRESS NOTES
Chief Complaint   Patient presents with    Physical     1. Have you been to the ER, urgent care clinic since your last visit? Hospitalized since your last visit? Yes- [facility]. 2. Have you seen or consulted any other health care providers outside of the [address] [place] [address] since your last visit? Include any pap smears or colon screening.  No    Visit Vitals  /61   Pulse 80   Temp 98.2 °F (36.8 °C) (Temporal)   Resp 18   Ht 5' 7\" (1.702 m)   Wt 138 lb (62.6 kg)   LMP 10/21/2020 (Exact Date)   SpO2 100%   BMI 21.61 kg/m²

## 2021-09-10 LAB
25(OH)D3+25(OH)D2 SERPL-MCNC: 46.9 NG/ML (ref 30–100)
T3FREE SERPL-MCNC: 2.3 PG/ML (ref 2–4.4)
T4 FREE SERPL-MCNC: 1.35 NG/DL (ref 0.82–1.77)
TSH SERPL DL<=0.005 MIU/L-ACNC: 2.33 UIU/ML (ref 0.45–4.5)

## 2021-10-25 LAB
ANTIBODY SCREEN, EXTERNAL: NEGATIVE
CHLAMYDIA, EXTERNAL: NEGATIVE
HBSAG, EXTERNAL: NEGATIVE
HIV, EXTERNAL: NEGATIVE
N. GONORRHEA, EXTERNAL: NEGATIVE
RPR, EXTERNAL: NON REACTIVE
RUBELLA, EXTERNAL: NORMAL
TYPE, ABO & RH, EXTERNAL: NORMAL

## 2022-03-19 PROBLEM — D25.9 FIBROID UTERUS: Status: ACTIVE | Noted: 2020-11-10

## 2022-05-12 LAB — GRBS, EXTERNAL: NEGATIVE

## 2022-06-02 ENCOUNTER — HOSPITAL ENCOUNTER (INPATIENT)
Age: 32
LOS: 2 days | Discharge: HOME | End: 2022-06-04
Attending: OBSTETRICS & GYNECOLOGY | Admitting: OBSTETRICS & GYNECOLOGY
Payer: COMMERCIAL

## 2022-06-02 PROBLEM — O34.29 PREGNANCY W/ HX OF UTERINE MYOMECTOMY: Status: ACTIVE | Noted: 2022-06-02

## 2022-06-02 PROBLEM — O42.90 AMNIOTIC FLUID LEAKING: Status: ACTIVE | Noted: 2022-06-02

## 2022-06-02 LAB
ABO + RH BLD: NORMAL
BLOOD GROUP ANTIBODIES SERPL: NORMAL
ERYTHROCYTE [DISTWIDTH] IN BLOOD BY AUTOMATED COUNT: 13.6 % (ref 11.5–14.5)
HCT VFR BLD AUTO: 38 % (ref 35–47)
HGB BLD-MCNC: 13.5 G/DL (ref 11.5–16)
MCH RBC QN AUTO: 33.8 PG (ref 26–34)
MCHC RBC AUTO-ENTMCNC: 35.5 G/DL (ref 30–36.5)
MCV RBC AUTO: 95.2 FL (ref 80–99)
NRBC # BLD: 0 K/UL (ref 0–0.01)
NRBC BLD-RTO: 0 PER 100 WBC
PLATELET # BLD AUTO: 171 K/UL (ref 150–400)
PMV BLD AUTO: 11.5 FL (ref 8.9–12.9)
RBC # BLD AUTO: 3.99 M/UL (ref 3.8–5.2)
SPECIMEN EXP DATE BLD: NORMAL
WBC # BLD AUTO: 13.3 K/UL (ref 3.6–11)

## 2022-06-02 PROCEDURE — 74011000250 HC RX REV CODE- 250: Performed by: OBSTETRICS & GYNECOLOGY

## 2022-06-02 PROCEDURE — 36415 COLL VENOUS BLD VENIPUNCTURE: CPT

## 2022-06-02 PROCEDURE — 65270000029 HC RM PRIVATE

## 2022-06-02 PROCEDURE — 4A1HXCZ MONITORING OF PRODUCTS OF CONCEPTION, CARDIAC RATE, EXTERNAL APPROACH: ICD-10-PCS | Performed by: OBSTETRICS & GYNECOLOGY

## 2022-06-02 PROCEDURE — 85027 COMPLETE CBC AUTOMATED: CPT

## 2022-06-02 PROCEDURE — 0KQM0ZZ REPAIR PERINEUM MUSCLE, OPEN APPROACH: ICD-10-PCS | Performed by: OBSTETRICS & GYNECOLOGY

## 2022-06-02 PROCEDURE — 86900 BLOOD TYPING SEROLOGIC ABO: CPT

## 2022-06-02 PROCEDURE — 74011250636 HC RX REV CODE- 250/636: Performed by: OBSTETRICS & GYNECOLOGY

## 2022-06-02 RX ORDER — LIDOCAINE HYDROCHLORIDE 10 MG/ML
INJECTION INFILTRATION; PERINEURAL
Status: DISPENSED
Start: 2022-06-02 | End: 2022-06-03

## 2022-06-02 RX ORDER — IBUPROFEN 400 MG/1
800 TABLET ORAL EVERY 8 HOURS
Status: DISCONTINUED | OUTPATIENT
Start: 2022-06-03 | End: 2022-06-04 | Stop reason: HOSPADM

## 2022-06-02 RX ORDER — ONDANSETRON 2 MG/ML
4 INJECTION INTRAMUSCULAR; INTRAVENOUS
Status: DISCONTINUED | OUTPATIENT
Start: 2022-06-02 | End: 2022-06-03 | Stop reason: HOSPADM

## 2022-06-02 RX ORDER — NALBUPHINE HYDROCHLORIDE 20 MG/ML
10 INJECTION, SOLUTION INTRAMUSCULAR; INTRAVENOUS; SUBCUTANEOUS
Status: DISCONTINUED | OUTPATIENT
Start: 2022-06-02 | End: 2022-06-03 | Stop reason: HOSPADM

## 2022-06-02 RX ORDER — NALOXONE HYDROCHLORIDE 0.4 MG/ML
0.4 INJECTION, SOLUTION INTRAMUSCULAR; INTRAVENOUS; SUBCUTANEOUS AS NEEDED
Status: DISCONTINUED | OUTPATIENT
Start: 2022-06-02 | End: 2022-06-04 | Stop reason: HOSPADM

## 2022-06-02 RX ORDER — OXYTOCIN/RINGER'S LACTATE 30/500 ML
PLASTIC BAG, INJECTION (ML) INTRAVENOUS
Status: DISPENSED
Start: 2022-06-02 | End: 2022-06-03

## 2022-06-02 RX ORDER — ACETAMINOPHEN 325 MG/1
650 TABLET ORAL
Status: DISCONTINUED | OUTPATIENT
Start: 2022-06-02 | End: 2022-06-04 | Stop reason: HOSPADM

## 2022-06-02 RX ORDER — OXYTOCIN/RINGER'S LACTATE 30/500 ML
87.3 PLASTIC BAG, INJECTION (ML) INTRAVENOUS AS NEEDED
Status: DISCONTINUED | OUTPATIENT
Start: 2022-06-02 | End: 2022-06-04 | Stop reason: HOSPADM

## 2022-06-02 RX ORDER — SODIUM CHLORIDE 0.9 % (FLUSH) 0.9 %
5-40 SYRINGE (ML) INJECTION EVERY 8 HOURS
Status: DISCONTINUED | OUTPATIENT
Start: 2022-06-02 | End: 2022-06-04 | Stop reason: HOSPADM

## 2022-06-02 RX ORDER — HYDROCORTISONE ACETATE PRAMOXINE HCL 2.5; 1 G/100G; G/100G
CREAM TOPICAL AS NEEDED
Status: DISCONTINUED | OUTPATIENT
Start: 2022-06-02 | End: 2022-06-04 | Stop reason: HOSPADM

## 2022-06-02 RX ORDER — OXYTOCIN/RINGER'S LACTATE 30/500 ML
10 PLASTIC BAG, INJECTION (ML) INTRAVENOUS AS NEEDED
Status: DISCONTINUED | OUTPATIENT
Start: 2022-06-02 | End: 2022-06-04 | Stop reason: HOSPADM

## 2022-06-02 RX ORDER — FENTANYL CITRATE 50 UG/ML
100 INJECTION, SOLUTION INTRAMUSCULAR; INTRAVENOUS
Status: DISCONTINUED | OUTPATIENT
Start: 2022-06-02 | End: 2022-06-03 | Stop reason: HOSPADM

## 2022-06-02 RX ORDER — SIMETHICONE 80 MG
80 TABLET,CHEWABLE ORAL
Status: DISCONTINUED | OUTPATIENT
Start: 2022-06-02 | End: 2022-06-04 | Stop reason: HOSPADM

## 2022-06-02 RX ORDER — FOLIC ACID/MULTIVIT,IRON,MINER 0.4MG-18MG
1 TABLET ORAL DAILY
Status: DISCONTINUED | OUTPATIENT
Start: 2022-06-03 | End: 2022-06-04 | Stop reason: HOSPADM

## 2022-06-02 RX ORDER — HYDROCODONE BITARTRATE AND ACETAMINOPHEN 5; 325 MG/1; MG/1
1 TABLET ORAL
Status: DISCONTINUED | OUTPATIENT
Start: 2022-06-02 | End: 2022-06-04 | Stop reason: HOSPADM

## 2022-06-02 RX ORDER — TERBUTALINE SULFATE 1 MG/ML
0.25 INJECTION SUBCUTANEOUS AS NEEDED
Status: DISCONTINUED | OUTPATIENT
Start: 2022-06-02 | End: 2022-06-03 | Stop reason: HOSPADM

## 2022-06-02 RX ORDER — SODIUM CHLORIDE 0.9 % (FLUSH) 0.9 %
5-40 SYRINGE (ML) INJECTION AS NEEDED
Status: DISCONTINUED | OUTPATIENT
Start: 2022-06-02 | End: 2022-06-04 | Stop reason: HOSPADM

## 2022-06-02 RX ADMIN — SODIUM CHLORIDE, PRESERVATIVE FREE 10 ML: 5 INJECTION INTRAVENOUS at 18:02

## 2022-06-02 RX ADMIN — SODIUM CHLORIDE, POTASSIUM CHLORIDE, SODIUM LACTATE AND CALCIUM CHLORIDE 1000 ML: 600; 310; 30; 20 INJECTION, SOLUTION INTRAVENOUS at 21:25

## 2022-06-02 NOTE — PROGRESS NOTES
A  admitted to [facility] 7 under the services of Dr. Leonarda Noriega, Dr. Sonia Tse covering. Pt states UC's got stronger and more regular 1630 today and SROM occurred on arrival to hospital at [phone], noted clear fluid. Pt denies problems with this pregnancy. :  IV started in L arm, labs drawn with IV start. :  US per Dr. Sonia Tse and vertex presentation confirmed. :  Cervical exam per Dr. Sonia Tse:  /-1, continues with moderate amt of clear fluid leaking. :  Bedside and Verbal shift change report given to MENDEL Banks RN (oncoming nurse) by FLY Rowley RN (offgoing nurse). Report included the following information SBAR, Kardex, Intake/Output, MAR and Recent Results.

## 2022-06-02 NOTE — PROGRESS NOTES
1935~  Bedside and Verbal shift change report given to Lindsey Zeng RN (oncoming nurse) by Mark Lloyd RN (offgoing nurse). Report included the following information SBAR, Intake/Output and Recent Results. 2010~  L Annamarie CNM in room to meet patient. 2110`  SHIREEN De Luna CNM in to assess progress of labor. Discussed \"spinning babies\", IV hydration, shower/nipple stimulation, walking/[employer]. Patient asked about epidural and how that changes the process, pitocin also discussed for augmentation post epidural if that is the choice she makes. 2200~  Up to bathroom    2233~  RN at bedside, continuously monitoring FHR tracing    0115~  Patient up to bathroom with assistance. Pericare done, fresh icepad and panties on.    0135~  Patient sitting up to eat. Dad holding baby. 0220~  TRANSFER - OUT REPORT:    Verbal report given to Bettye MALONE(name) on [employer]  being transferred to  (unit) for routine progression of care       Report consisted of patients Situation, Background, Assessment and   Recommendations(SBAR). Information from the following report(s) SBAR, Intake/Output, MAR and Recent Results was reviewed with the receiving nurse. Lines:   Peripheral IV 06/02/22 Left Arm (Active)   Site Assessment Clean, dry, & intact 06/02/22 1845   Phlebitis Assessment 0 06/02/22 1845   Infiltration Assessment 0 06/02/22 1845   Dressing Status Clean, dry, & intact 06/02/22 1845   Dressing Type Transparent 06/02/22 1845   Hub Color/Line Status Pink 06/02/22 1845        Opportunity for questions and clarification was provided.       Patient transported with:   Registered Nurse

## 2022-06-02 NOTE — H&P
Labor and Delivery Admission Note  CC: Painful uterine contractions and leaking amniotic fluid    6/2/2022    27 y.o., , female, G2 P 0010 @ 40 0/7 wks with Estimated Date of Delivery: 6/2/22 by dates and US presents at 200 with above CC. Pt was seen in the office today and was 3 cm and declared to be likely in early labor. Her contractions increased and while walking in she had gross ROM. Reports good fetal movement, no bleeding, and has mod contractions. Pregnancy has been supervised by Dr. Maximiliano Mancuso and is notable for h/o myomectomy 11/20 by Dr. Maximiliano Mancuso. Per Dr. Maximiliano Mancuso, this was not felt to be extensive uterine surgery (10 x 5 cm ant intramural fibroid wo entry into cavity). They have had extensive discussion and agree that pt is a labor candidate.     PNC: Blood type: A            RH: pos            Ab screen: neg            HBsAg: neg            DM Screen: 113            RPR/VDRL: non reactive            HIV: neg            GC/Chlamydia: neg            Rubella: immune            SVII serology: no history             GBS status: neg    Past Medical History:   Diagnosis Date    Adverse effect of anesthesia     VOMITING AFTER WISDOM TEETH EXTRACTION    Heart abnormality     heart jmurmur as a child     Past Surgical History:   Procedure Laterality Date    HX GYN      fibroidectomy    HX HEENT      WISDOM TEETH    HX OTHER SURGICAL      wisdom teeth    HX OTHER SURGICAL  11/2020    fibroidectomy      OB/GYN: Dr. Maximiliano Mancuso  Meds:   Current Facility-Administered Medications   Medication Dose Route Frequency    sodium chloride (NS) flush 5-40 mL  5-40 mL IntraVENous Q8H    sodium chloride (NS) flush 5-40 mL  5-40 mL IntraVENous PRN    lactated ringers bolus infusion 500-1,000 mL  500-1,000 mL IntraVENous PRN    terbutaline (BRETHINE) injection 0.25 mg  0.25 mg SubCUTAneous PRN    oxytocin (PITOCIN) 10 unit bolus from bag  10 Units IntraVENous PRN    And    oxytocin (PITOCIN) 30 units/500 ml LR 87.3 harris-units/min IntraVENous PRN    nalbuphine (NUBAIN) injection 10 mg  10 mg IntraVENous Q3H PRN    fentaNYL citrate (PF) injection 100 mcg  100 mcg IntraVENous Q1H PRN    ondansetron (ZOFRAN) injection 4 mg  4 mg IntraVENous Q8H PRN     Allergies:    Allergies   Allergen Reactions    Shellfish Derived Nausea and Vomiting     Pertinent ROS: as per HPI o/w neg   Family History   Problem Relation Age of Onset    No Known Problems Mother     Cancer Father         BLADDER    COPD Father     No Known Problems Brother     Cancer Maternal Grandmother         lung    Heart Disease Maternal Grandfather     COPD Paternal Grandmother     Cancer Paternal Grandfather         bladder    Anesth Problems Neg Hx      Social History     Socioeconomic History    Marital status:      Spouse name: Not on file    Number of children: Not on file    Years of education: Not on file    Highest education level: Not on file   Occupational History    Not on file   Tobacco Use    Smoking status: Never Smoker    Smokeless tobacco: Never Used   Substance and Sexual Activity    Alcohol use: Not Currently     Alcohol/week: 4.0 standard drinks     Types: 4 Glasses of wine per week    Drug use: Never    Sexual activity: Yes     Partners: Male     Birth control/protection: Condom   Other Topics Concern     Service Not Asked    Blood Transfusions Not Asked    Caffeine Concern Not Asked    Occupational Exposure Not Asked    Hobby Hazards Not Asked    Sleep Concern Not Asked    Stress Concern Not Asked    Weight Concern Not Asked    Special Diet Not Asked    Back Care Not Asked    Exercise Not Asked    Bike Helmet Not Asked    Seat Belt Not Asked    Self-Exams Not Asked   Social History Narrative    Not on file     Social Determinants of Health     Financial Resource Strain:     Difficulty of Paying Living Expenses: Not on file   Food Insecurity:     Worried About Running Out of Food in the Last Year: Not on file    Ran Out of Food in the Last Year: Not on file   Transportation Needs:     Lack of Transportation (Medical): Not on file    Lack of Transportation (Non-Medical): Not on file   Physical Activity:     Days of Exercise per Week: Not on file    Minutes of Exercise per Session: Not on file   Stress:     Feeling of Stress : Not on file   Social Connections:     Frequency of Communication with Friends and Family: Not on file    Frequency of Social Gatherings with Friends and Family: Not on file    Attends Mandaen Services: Not on file    Active Member of [address] [place] [address] [employer] or Organizations: Not on file    Attends Club or Organization Meetings: Not on file    Marital Status: Not on file   Intimate Partner Violence:     Fear of Current or Ex-Partner: Not on file    Emotionally Abused: Not on file    Physically Abused: Not on file    Sexually Abused: Not on file   Housing Stability:     Unable to Pay for Housing in the Last Year: Not on file    Number of [place] in the Last Year: Not on file    Unstable Housing in the Last Year: Not on file       OBJECTIVE:  Gravid female NAD  Temp (24hrs), Av.3 °F (36.8 °C), Min:98.3 °F (36.8 °C), Max:98.3 °F (36.8 °C)    Visit Vitals  /72 (BP 1 Location: Right upper arm, BP Patient Position: At rest)   Pulse 74   Temp 98.3 °F (36.8 °C)   Resp 18   Ht 5' 7\" (1.702 m)   Wt 77.6 kg (171 lb)   Breastfeeding No   BMI 26.78 kg/m²       Labs:    Lab Results   Component Value Date/Time    WBC 13.3 (H) 2022 06:10 PM       Exam:  HEENT:  normal   Lungs:  Normal respiratory effort  Cor:  Well perfused  Abdomen:  Thin, soft, gravid  Fetal heart rate tracing:  CAT I   Contraction pattern: q 2-3 minutes  Cervix:  6/90/-1/vtx (confirmed by US)  Fluid:  Clear, grossly ruptured  Pelvimetry:  AP-good                      Arch- adequate                      Sidewalls- adequate                      Pelvis feels adequate for fetus.     Impression:  IUP at 40 0/7 weeks with h/o myomectomy and ROM/Labor.     Plan: Anticipate            Desires low intervention           Reviewed op note and discussed pt with Dr. Byron Pastor; reviewed with pt and  and  varying degrees of uterine involvement with myomectomies; Dr. Byron Pastor does not feel this myomectomy performed by her was extensive uterine surgery; reviewed with pt that there is still a risk of uterine rupture in labor which could lead to fetal compromise and require emergent surgery; I am unable to tell her a percentage for that risk; we reviewed continuous fetal monitoring, IV access, CBC/T&S as precautionary measures; they were given an opportunity to ask further questions             Pt has a  and would like midwife care overnight; she is aware that the midwife comes on at [address] [place] [address] MD

## 2022-06-03 PROCEDURE — 65410000002 HC RM PRIVATE OB

## 2022-06-03 PROCEDURE — 74011250637 HC RX REV CODE- 250/637: Performed by: ADVANCED PRACTICE MIDWIFE

## 2022-06-03 PROCEDURE — 75410000000 HC DELIVERY VAGINAL/SINGLE

## 2022-06-03 PROCEDURE — 75410000002 HC LABOR FEE PER 1 HR

## 2022-06-03 PROCEDURE — 75410000003 HC RECOV DEL/VAG/CSECN EA 0.5 HR

## 2022-06-03 RX ADMIN — Medication 1 TABLET: at 10:56

## 2022-06-03 RX ADMIN — IBUPROFEN 800 MG: 400 TABLET, FILM COATED ORAL at 10:56

## 2022-06-03 RX ADMIN — IBUPROFEN 800 MG: 400 TABLET, FILM COATED ORAL at 21:33

## 2022-06-03 RX ADMIN — IBUPROFEN 800 MG: 400 TABLET, FILM COATED ORAL at 00:39

## 2022-06-03 NOTE — ROUTINE PROCESS
TRANSFER - IN REPORT:    Verbal report received from [facility] Brand(name) on [employer]  being received from L&D(unit) for routine progression of care      Report consisted of patients Situation, Background, Assessment and   Recommendations(SBAR). Information from the following report(s) SBAR was reviewed with the receiving nurse. Opportunity for questions and clarification was provided. Assessment completed upon patients arrival to unit and care assumed.

## 2022-06-03 NOTE — LACTATION NOTE
This note was copied from a baby's chart. Initial Lactation Consultation - Baby born vaginally late last night to a  mom at 40 weeks gestation. Mom noticed breast changes during her pregnancy and has been able to express drops of colostrum. Mom state baby has had a couple good feeds since birth but baby has been sleepy this afternoon since his circ. I helped mom with a feeding this afternoon. We reviewed positioning the baby at the breast and how mom can help baby get a deep latch. We were able to get baby latched deeply in the cross cradle hold. Baby latched deeply after several attempts and began sucking rhythmically with some swallows noted. Feeding Plan: Mother will keep baby skin to skin as often as possible, feed on demand, respond to feeding cues, obtain latch, listen for audible swallowing, be aware of signs of oxytocin release/ cramping, thirst and sleepiness while breastfeeding. Mom will not limit the time the baby is at the breast. She will allow the baby to completely finish one breast and then offer the second breast at each feeding.

## 2022-06-03 NOTE — PROGRESS NOTES
CNM Labor Progress Note     Patient: Corazon Bowman MRN: [id number]  SSN: [id number]    YOB: 1990  Age: 27 y.o. Sex: female      Care assumed at     Subjective:   Patient coping well with contractions, standing at side of bed.  and  are at bedside and are providing excellent support. Pt starting to work hard. Objective:   Blood pressure 130/72, pulse 74, temperature 98.3 °F (36.8 °C), resp. rate 18, height 5' 7\" (1.702 m), weight 77.6 kg (171 lb), not currently breastfeeding. Patient Vitals for the past 4 hrs: Mode Fetal Heart Rate Fetal Activity Variability Decelerations Accelerations RN Reviewed Strip?   22 2002 External 115 Present 6-25 BPM Variable Yes Yes   22 1930 External 120 Present 6-25 BPM Variable Yes Yes   22 1900 External 135 Present 6-25 BPM Variable Yes Yes   22 1830 External 120 Present 6-25 BPM Variable Yes Yes   22 1800 External 120 Present 6-25 BPM Variable Yes Yes   22 1740 External 145 -- -- -- -- --     Uterine contractions q 2 minutes, strong to palpation, resting tone soft, Sterile Vaginal Exam: deferred, fetal presentation vertex, membranes ruptured for continued clear fluid    Assessment:     40w0d  Category 1 fetal heart rate tracing   Labor   Hx myomectomy    Plan:   Introduced self to patient. Offered encouragement and support. Cont expectant management. Recheck cervix with maternal or fetal indication. Limit SVEs to prevent infection. Cont continuous fetal and toco monitoring due to history of myomectomy.   Anticipate     Jonberg Jorge Line

## 2022-06-03 NOTE — PROGRESS NOTES
[facility] Labor Progress Note     Patient: Elmo Mccormick MRN: [id number]  SSN: [id number]    YOB: 1990  Age: 27 y.o. Sex: female        Subjective:   Patient working with contractions, getting really tired, starting to consider epidural.  and  are at bedside and are providing excellent support. Objective:   Blood pressure 121/67, pulse 76, temperature 98.3 °F (36.8 °C), resp. rate 18, height 5' 7\" (1.702 m), weight 77.6 kg (171 lb), not currently breastfeeding. Patient Vitals for the past 4 hrs: Mode Fetal Heart Rate Fetal Activity Variability Decelerations Accelerations RN Reviewed Strip?   22 2100 External 110 Present 6-25 BPM Variable Yes Yes   22 2030 External 115 Present 6-25 BPM Variable Yes Yes   22 2002 External 115 Present 6-25 BPM Variable Yes Yes   22 1930 External 120 Present 6-25 BPM Variable Yes Yes   22 1900 External 135 Present 6-25 BPM Variable Yes Yes   22 1830 External 120 Present 6-25 BPM Variable Yes Yes   22 1800 External 120 Present 6-25 BPM Variable Yes Yes     Uterine contractions q 1-5 minutes, moderate to strong to palpation, resting tone soft, Sterile Vaginal Exam: deferred, fetal presentation vertex, membranes ruptured for continued clear fluid    Assessment:     40w0d  Category 1 fetal heart rate tracing   Labor     Plan:   Discussed process for epidural, pt will continue to discuss.    IVF bolus and some fruit for energy  Spinning babies to optimize fetal positioning  Cont continuous fetal and toco monitoring  Recheck cervix with maternal or fetal monitoring  Anticipate SHONA Hilliard, [facility]

## 2022-06-03 NOTE — L&D DELIVERY NOTE
CNM Delivery Note       Patient: Jaron Salmon MRN: [id number]  SSN: [id number]    YOB: 1990  Age: 27 y.o. Sex: female        Complete cervical dilation at 2230.  of a live male infant at 18 with Apgars 9,10 in HENRY position under no anesthesia with mother in left side lying position. Shoulders spontaneous, easily delivered with maternal effort. Vigorous infant placed on maternal abdomen immediately following delivery. Once cord stopped pulsating it was double clamped by CNM and cut by FOB. Placenta and membranes spontaneous, intact, with 3 vessel cord via [place] mechanism at . Fundus massaged to firm. Vagina and perineum inspected. 2nd degree perineal laceration repaired with 2-0 vicryl under local anesthesia for great approximation, hemostasis and cosmesis. Mother and infant stable, bonding, establishing breastfeeding. QBL 624ml. Delivery Summary    Patient: Jaron Salmon MRN: [id number]  SSN: [id number]    YOB: 1990  Age: 27 y.o. Sex: female       Information for the patient's :  Emiliano Winsted [[id number]]       Labor Events:    Labor: No    Steroids: None   Cervical Ripening Date/Time:       Cervical Ripening Type: None   Antibiotics During Labor: No   Rupture Identifier:      Rupture Date/Time: 2022 5:20 PM   Rupture Type: SROM   Amniotic Fluid Volume:  Moderate    Amniotic Fluid Description: Clear    Amniotic Fluid Odor: None    Induction: None       Induction Date/Time:        Indications for Induction:      Augmentation: None   Augmentation Date/Time:      Indications for Augmentation:     Labor complications: None       Additional complications:        Delivery Events:  Indications For Episiotomy:     Episiotomy: None   Perineal Laceration(s): 2nd   Repaired: Yes   Periurethral Laceration Location:      Repaired:     Labial Laceration Location:     Repaired:     Sulcal Laceration Location:     Repaired:     Vaginal Laceration Location:     Repaired:     Cervical Laceration Location:     Repaired:     Repair Suture: Vicryl 2-0   Number of Repair Packets: 1   Estimated Blood Loss (ml):  ml   Quantitative Blood Loss (ml)                Delivery Date: 2022    Delivery Time: 11:15 PM  Delivery Type: Vaginal, Spontaneous  Sex:  Male    Gestational Age: 37w0d   Delivery Clinician:  Catherine Damon  Living Status: Living   Delivery Location: L&D LDR 7          APGARS  One minute Five minutes Ten minutes   Skin color: 1   2        Heart rate: 2   2        Grimace: 2   2        Muscle tone: 2   2        Breathin   2        Totals: 9   10            Presentation: Vertex    Position: Left Occiput Anterior  Resuscitation Method:  None     Meconium Stained: None      Cord Information: 3 Vessels  Complications: None  Cord around:    Delayed cord clamping? Yes  Cord clamped date/time:   Disposition of Cord Blood: Discard    Blood Gases Sent?: No    Placenta:  Date/Time: 2022 11:23 PM  Removal: Spontaneous      Appearance: Normal      Measurements:  Birth Weight: 3.6 kg      Birth Length: 52.1 cm      Head Circumference: 36 cm      Chest Circumference:       Abdominal Girth: 33 cm    Other Providers:   ;GABRIELE DAMON;NATAN GAMEZ;;;;;;DION EPPS, Obstetrician;Primary Nurse;Primary  Nurse;Nicu Nurse;Neonatologist;Anesthesiologist;Crna;Nurse Practitioner;Midwife;Nursery Nurse           Group B Strep:   Lab Results   Component Value Date/Time    GrBStrep, External negative 2022 12:00 AM     Information for the patient's :  Tab Salguero [[id number]]   No results found for: ABORH, PCTABR, PCTDIG, BILI, ABORHEXT, ABORH     No results for input(s): PCO2CB, PO2CB, HCO3I, SO2I, IBD, PTEMPI, SPECTI, PHICB, ISITE, IDEV, IALLEN in the last 72 hours.

## 2022-06-03 NOTE — PROGRESS NOTES
Post-Partum Day Number 1 Progress Note    Patient doing well post-partum without significant complaints. Voiding without difficulty, normal lochia. Vitals:  Patient Vitals for the past 24 hrs:   BP Temp Pulse Resp Height Weight   22 0629 (!) 97/58 99.2 °F (37.3 °C) 76 16 -- --   22 0227 100/62 97.9 °F (36.6 °C) 88 16 -- --   22 0148 113/60 -- 71 -- -- --   22 0133 112/63 98.6 °F (37 °C) 62 -- -- --   22 0121 (!) 111/57 -- 67 -- -- --   22 0103 109/67 -- 70 -- -- --   22 0048 111/69 -- 77 -- -- --   22 0033 107/67 -- 71 -- -- --   22 0018 (!) 106/56 -- 60 -- -- --   22 0003 (!) 110/55 -- 69 -- -- --   22 2348 115/61 -- 73 -- -- --   22 121/67 -- 76 -- -- --   22 1904 130/72 -- 74 18 -- --   22 1814 -- -- -- -- 5' 7\" (1.702 m) 77.6 kg (171 lb)   22 1743 118/70 98.3 °F (36.8 °C) 63 18 -- --     Temp (24hrs), Av.5 °F (36.9 °C), Min:97.9 °F (36.6 °C), Max:99.2 °F (37.3 °C)      Vital signs stable, afebrile. Exam:  Patient without distress. Abdomen soft, fundus firm, nontender               Lower extremities- nontender without edema; no cords    Labs:   Recent Results (from the past 24 hour(s))   CBC W/O DIFF    Collection Time: 22  6:10 PM   Result Value Ref Range    WBC 13.3 (H) 3.6 - 11.0 K/uL    RBC 3.99 3.80 - 5.20 M/uL    HGB 13.5 11.5 - 16.0 g/dL    HCT 38.0 35.0 - 47.0 %    MCV 95.2 80.0 - 99.0 FL    MCH 33.8 26.0 - 34.0 PG    MCHC 35.5 30.0 - 36.5 g/dL    RDW 13.6 11.5 - 14.5 %    PLATELET 749 098 - 035 K/uL    MPV 11.5 8.9 - 12.9 FL    NRBC 0.0 0  WBC    ABSOLUTE NRBC 0.00 0.00 - 0.01 K/uL   TYPE & SCREEN    Collection Time: 22  6:10 PM   Result Value Ref Range    Crossmatch Expiration 2022,2359     ABO/Rh(D) A POSITIVE     Antibody screen NEG        Assessment and Plan:  PPD 1, uncomplicated, unmedicated delivery. A+/RI/male infant for circumcision.   Reviewed risks of circumcision including bleeding, infection, and damage to the penis/possible need for revision. Informed consent obtained and signed. Anticipate DC home PPD 2.       Signed By: Deysi Garcia DO     Mena 3, 2022

## 2022-06-04 VITALS
RESPIRATION RATE: 16 BRPM | BODY MASS INDEX: 26.84 KG/M2 | HEIGHT: 67 IN | TEMPERATURE: 97.7 F | HEART RATE: 76 BPM | WEIGHT: 171 LBS | DIASTOLIC BLOOD PRESSURE: 67 MMHG | SYSTOLIC BLOOD PRESSURE: 98 MMHG | OXYGEN SATURATION: 96 %

## 2022-06-04 PROCEDURE — 74011250637 HC RX REV CODE- 250/637: Performed by: ADVANCED PRACTICE MIDWIFE

## 2022-06-04 RX ORDER — IBUPROFEN 800 MG/1
800 TABLET ORAL
Qty: 30 TABLET | Refills: 1 | Status: SHIPPED | OUTPATIENT
Start: 2022-06-04

## 2022-06-04 RX ADMIN — IBUPROFEN 800 MG: 400 TABLET, FILM COATED ORAL at 06:03

## 2022-06-04 NOTE — DISCHARGE SUMMARY
Obstetrical Discharge Summary     Name: Jose Garsia MRN: [id number]  SSN: [id number]    YOB: 1990  Age: 27 y.o. Sex: female      Allergies: Shellfish derived    Admit Date: 2022    Discharge Date: 2022     Admitting Physician: Qi Sanchez MD     Attending Physician:  Margie Jones, DO     * Admission Diagnoses: Pregnancy w/ hx of uterine myomectomy [O34.29]  Amniotic fluid leaking [O42.90]    * Discharge Diagnoses:   Information for the patient's :  Mar Fournier [[id number]]   Delivery of a 3.6 kg male infant via Vaginal, Spontaneous on 2022 at 11:15 PM  by Reilly Lomas. Apgars were 9  and 10 .        Additional Diagnoses:   Hospital Problems as of 2022 Date Reviewed: 9/3/2021          Codes Class Noted - Resolved POA    Amniotic fluid leaking ICD-10-CM: O42.90  ICD-9-CM: 658.10  2022 - Present Unknown        Pregnancy w/ hx of uterine myomectomy ICD-10-CM: O34.29  ICD-9-CM: 654.90  2022 - Present Unknown             Lab Results   Component Value Date/Time    ABO/Rh(D) A POSITIVE 2022 06:10 PM    Rubella, External Immune 10/25/2021 12:00 AM    GrBStrep, External negative 2022 12:00 AM    ABO,Rh A positive 10/25/2021 12:00 AM      Immunization History   Administered Date(s) Administered    Influenza Vaccine 10/15/2020       * Procedures: , routine postpartum care    Retired Use Flowsheet (6) [place]  Depression Scale  I have been able to laugh and see the funny side of things: As much as I always could  I have looked forward with enjoyment to things: As much as I ever did  I have blamed myself unnecessarily when things went wrong: No, never  I have been anxious or worried for no good reason: No, not at all  I have felt scared or panicky for no very good reason: No, not at all  Things have been getting on top of me: No, I have been coping as well as ever  I have been so unhappy that I have had difficulty sleeping: No, not at all  I have felt sad or miserable: No, not at all  I have been so unhappy that I have been crying: No, never  The thought of harming myself has occurred to me: Never  Total Score: 0    * Discharge Condition: good    * Hospital Course: Normal hospital course following the delivery. * Disposition: Home    Discharge Medications:   Current Discharge Medication List      CONTINUE these medications which have CHANGED    Details   ibuprofen (MOTRIN) 800 mg tablet Take 1 Tablet by mouth every eight (8) hours as needed for Pain. Qty: 30 Tablet, Refills: 1  Start date: 6/4/2022         CONTINUE these medications which have NOT CHANGED    Details   CHOLINE-10 PO Take  by mouth daily. PNV Comb #2-Iron-Omega 3-FA 60-4-629-200 mg cmpk Take  by mouth. omega 3-dha-epa-fish oil (Fish Oil) 100-160-1,000 mg cap Take  by mouth. * Follow-up Care/Patient Instructions: Activity: Activity as tolerated and No sex for 6 weeks  Diet: Regular Diet  Wound Care: Ice to area for comfort    Follow-up Information     Follow up With Specialties Details Why Contact Info    Reny Pandya, [address] [facility] Gynecology, Gynecology, Obstetrics In 2 weeks  [address] [facility] [address] [address]  [phone]                        .

## 2022-06-04 NOTE — PROGRESS NOTES
Post-Partum Day Number 2 Progress Note    Patient doing well post-partum without significant complaints. Voiding without difficulty, normal lochia. Vitals:  Patient Vitals for the past 24 hrs:   BP Temp Pulse Resp SpO2   22 0825 98/67 97.7 °F (36.5 °C) 76 16 --   22 0558 98/62 97.9 °F (36.6 °C) 68 16 96 %   22 2128 110/65 97.7 °F (36.5 °C) 65 16 97 %   22 1630 (!) 108/57 97.6 °F (36.4 °C) 80 16 98 %   22 1128 100/60 97.8 °F (36.6 °C) 75 14 97 %     Temp (24hrs), Av.7 °F (36.5 °C), Min:97.6 °F (36.4 °C), Max:97.9 °F (36.6 °C)      Vital signs stable, afebrile. Exam:  Patient without distress. Abdomen soft, fundus firm, nontender               Lower extremities- nontender without edema; no cords    Labs: No results found for this or any previous visit (from the past 24 hour(s)). Assessment and Plan:  Patient appears to be having uncomplicated post-partum course. Discharge today-instructions reviewed. A+/RI/male infant s/p uncomplicated circumcision.       Signed By: Elisa Rosenthal DO     2022

## 2022-06-04 NOTE — PROGRESS NOTES
Bedside shift change report given to CHIDI Denis (oncoming nurse) by Helen Norris RN (offgoing nurse). Report included the following information SBAR.

## 2022-06-04 NOTE — DISCHARGE INSTRUCTIONS
POSTPARTUM DISCHARGE INSTRUCTIONS       Name:  Constance Macdonald  YOB: 1990  Admission Diagnosis:  Pregnancy w/ hx of uterine myomectomy [O34.29]  Amniotic fluid leaking [O42.90]     Discharge Diagnosis:    Problem List as of 6/4/2022 Date Reviewed: 9/3/2021          Codes Class Noted - Resolved    Amniotic fluid leaking ICD-10-CM: O42.90  ICD-9-CM: 658.10  6/2/2022 - Present        Pregnancy w/ hx of uterine myomectomy ICD-10-CM: O34.29  ICD-9-CM: 654.90  6/2/2022 - Present        Fibroid uterus ICD-10-CM: D25.9  ICD-9-CM: 218.9  11/10/2020 - Present            Attending Physician:  Aracelis Orta, DO    Delivery Type:  Vaginal Childbirth with Episiotomy, Laceration or Tear: What To Expect At [address] [place] [address] body will slowly heal in the next few weeks. It is easy to get too tired and overwhelmed during the first weeks after your baby is born. Changes in your hormones can shift your mood without warning. You may find it hard to meet the extra demands on your energy and time. Take it easy on yourself. Follow-up care is a key part of your treatment and safety. Be sure to make and go to all appointments, and call your doctor if you are having problems. It's also a good idea to know your test results and keep a list of the medicines you take. How can you care for yourself at home? Vaginal Bleeding and Cramps  · After delivery, you will have a bloody discharge from the vagina. This will turn pink within a week and then white or yellow after about 10 days. It may last for 2 to 4 weeks or longer, until the uterus has healed. Use pads instead of tampons until you stop bleeding. · Do not worry if you pass some blood clots, as long as they are smaller than a golf ball. If you have a tear or stitches in your vaginal area, change the pad at least every 4 hours to prevent soreness and infection. · You may have cramps for the first few days after childbirth.  These are normal and occur as the uterus shrinks to normal size. Take an over-the-counter pain medicine, such as acetaminophen (Tylenol), ibuprofen (Advil, Motrin), or naproxen (Aleve), for cramps. Read and follow all instructions on the label. Do not take aspirin, because it can cause more bleeding. Do not take acetaminophen (Tylenol) and other acetaminophen containing medications (i.e. Percocet) at the same time. Episiotomy, Lacerations or Tears  · If you have stitches, they will dissolve on their own and do not need to be removed. · Put ice or a cold pack on your painful area for 10 to 20 minutes at a time, several times a day, for the first few days. Put a thin cloth between the ice and your skin. · Sit in a few inches of warm water (sitz bath) 3 times a day and after bowel movements. The warm water helps with pain and itching. If you do not have a tub, a warm shower might help. Breast fullness  · Your breasts may overfill (engorge) in the first few days after delivery. To help milk flow and to relieve pain, warm your breasts in the shower or by using warm, moist towels before nursing. · If you are not nursing, do not put warmth on your breasts or touch your breasts. Wear a tight bra or sports bra and use ice until the fullness goes away. This usually takes 2 to 3 days. · Put ice or a cold pack on your breast after nursing to reduce swelling and pain. Put a thin cloth between the ice and your skin. Activity  · Eat a balanced diet. Do not try to lose weight by cutting calories. Keep taking your prenatal vitamins, or take a multivitamin. · Get as much rest as you can. Try to take naps when your baby sleeps during the day. · Get some exercise every day. But do not do any heavy exercise until your doctor says it is okay. · Wait until you are healed (about 4 to 6 weeks) before you have sexual intercourse. Your doctor will tell you when it is okay to have sex. · Talk to your doctor about birth control.  You can get pregnant even before your period returns. Also, you can get pregnant while you are breast-feeding. Mental Health  · Many women get the \"baby blues\" during the first few days after childbirth. You may lose sleep, feel irritable, and cry easily. You may feel happy one minute and sad the next. Hormone changes are one cause of these emotional changes. Also, the demands of a new baby, along with visits from relatives or other family needs, add to a mother's stress. The \"baby blues\" often peak around the fourth day. Then they ease up in less than 2 weeks. · If your moodiness or anxiety lasts for more than 2 weeks, or if you feel like life is not worth living, you may have postpartum depression. This is different for each mother. Some mothers with serious depression may worry intensely about their infant's well-being. Others may feel distant from their child. Some mothers might even feel that they might harm their baby. A mother may have signs of paranoia, wondering if someone is watching her. · With all the changes in your life, you may not know if you are depressed. Pregnancy sometimes causes changes in how you feel that are similar to the symptoms of depression. · Symptoms of depression include:  · Feeling sad or hopeless and losing interest in daily activities. These are the most common symptoms of depression. · Sleeping too much or not enough. · Feeling tired. You may feel as if you have no energy. · Eating too much or too little. · [employer] ([employer]) offers a Warm line; Chat with the Expert phone sessions; Information and Articles about Pregnancy and Postpartum Mood Disorders; Comprehensive List of Free Support Groups; Knowledgeable local coordinators who will offer support, information, and resources; Guide to Resources on Scandid; Calendar of events in the  mood disorders community; Latest News and Research; and [facility] [address] for [employer]. Remember - You are not alone;  You are not to blame; With help, you will be well. 4-004-255-PPD(7367). WWW. POSTPARTUM. NET    · Writing or talking about death, such as writing suicide notes or talking about guns, knives, or pills. Keep the numbers for these national suicide hotlines: 9-260-920-TALK (8-511.545.1218) and 4-378-JZDMAFD (8-695.647.7852). If you or someone you know talks about suicide or feeling hopeless, get help right away. Constipation and Hemorrhoids  Drink plenty of fluids, enough so that your urine is light yellow or clear like water. If you have kidney, heart, or liver disease and have to limit fluids, talk with your doctor before you increase the amount of fluids you drink. · Eat plenty of fiber each day. Have a bran muffin or bran cereal for breakfast, and try eating a piece of fruit for a mid-afternoon snack. · For painful, itchy hemorrhoids, put ice or a cold pack on the area several times a day for 10 minutes at a time. Follow this by putting a warm compress on the area for another 10 to 20 minutes or by sitting in a shallow, warm bath. When should you call for help? Call 911 anytime you think you may need emergency care. For example, call if:  · You are thinking of hurting yourself, your baby, or anyone else. · You passed out (lost consciousness). · You have symptoms of a blood clot in your lung (called a pulmonary embolism). These may include:  · Sudden chest pain. · Trouble breathing. · Coughing up blood. Call your doctor now or seek immediate medical care if:  · You have severe vaginal bleeding. · You are soaking through a pad each hour for 2 or more hours. · Your vaginal bleeding seems to be getting heavier or is still bright red 4 days after delivery. · You are dizzy or lightheaded, or you feel like you may faint. · You are vomiting or cannot keep fluids down. · You have a fever. · You have new or more belly pain. · You pass tissue (not just blood). · Your vaginal discharge smells bad.   · Your belly feels tender or full and hard. · Your breasts are continuously painful or red. · You feel sad, anxious, or hopeless for more than a few days. · You have sudden, severe pain in your belly. · You have symptoms of a blood clot in your leg (called a deep vein thrombosis), such as:  · Pain in your calf, back of the knee, thigh, or groin. · Redness and swelling in your leg or groin. · You have symptoms of preeclampsia, such as:  · Sudden swelling of your face, hands, or feet. · New vision problems (such as dimness or blurring). · A severe headache. · Your blood pressure is higher than it should be or rises suddenly. · You have new nausea or vomiting. Watch closely for changes in your health, and be sure to contact your doctor if you have any problems. Additional Information:  Not applicable    These are general instructions for a healthy lifestyle:    No smoking/ No tobacco products/ Avoid exposure to second hand smoke    Surgeon General's Warning:  Quitting smoking now greatly reduces serious risk to your health. Obesity, smoking, and sedentary lifestyle greatly increases your risk for illness    A healthy diet, regular physical exercise & weight monitoring are important for maintaining a healthy lifestyle    Recognize signs and symptoms of STROKE:    F-face looks uneven    A-arms unable to move or move unevenly    S-speech slurred or non-existent    T-time-call 911 as soon as signs and symptoms begin - DO NOT go       back to bed or wait to see if you get better - TIME IS BRAIN. I have had the opportunity to make my options or choices for discharge. I have received and understand these instructions.

## 2023-02-03 ENCOUNTER — OFFICE VISIT (OUTPATIENT)
Dept: INTERNAL MEDICINE CLINIC | Age: 33
End: 2023-02-03
Payer: COMMERCIAL

## 2023-02-03 VITALS
HEART RATE: 85 BPM | WEIGHT: 138.8 LBS | HEIGHT: 67 IN | SYSTOLIC BLOOD PRESSURE: 102 MMHG | BODY MASS INDEX: 21.79 KG/M2 | TEMPERATURE: 98.3 F | DIASTOLIC BLOOD PRESSURE: 62 MMHG | RESPIRATION RATE: 20 BRPM | OXYGEN SATURATION: 98 %

## 2023-02-03 DIAGNOSIS — R25.2 LEG CRAMPS: Primary | ICD-10-CM

## 2023-02-03 DIAGNOSIS — R53.83 OTHER FATIGUE: ICD-10-CM

## 2023-02-03 DIAGNOSIS — M54.9 MID-BACK PAIN, ACUTE: ICD-10-CM

## 2023-02-03 DIAGNOSIS — R22.1 SUBCUTANEOUS NODULE OF NECK: ICD-10-CM

## 2023-02-03 NOTE — PROGRESS NOTES
Jaja Martinez is a 34 y.o. female  Chief Complaint   Patient presents with    Back Pain    Claudication     Visit Vitals  /62 (BP 1 Location: Left upper arm, BP Patient Position: Sitting, BP Cuff Size: Adult)   Pulse 85   Temp 98.3 °F (36.8 °C) (Temporal)   Resp 20   Ht 5' 7\" (1.702 m)   Wt 138 lb 12.8 oz (63 kg)   SpO2 98%   BMI 21.74 kg/m²          HPI  Patient here with a 3 day history of upper back and neck pain. Started Tuesday night / headache. Wednesday started with  back and neck pain- Located back/ cervical.   Pain located- lower back , around hips, up to shoulder blades. No urinary problems. Described as sharp  Does have an 7 month old. Also noticed a \"lump left side of neck\"  Felt cold and more hungry . Breast feeding. Took advil for headache/ epson salts bath. Also has leg cramps and fatigue started  Wednesday. Off and on. Worse at night. Left  whole  leg. Does Exercises-  pilates. walks  Diet good. Sleeps well. No fever. No ill exposures. Works from home    Past Medical History:   Diagnosis Date    Adverse effect of anesthesia     VOMITING AFTER WISDOM TEETH EXTRACTION    Heart abnormality     heart jmurmur as a child     Past Surgical History:   Procedure Laterality Date    HX GYN      fibroidectomy    HX HEENT      WISDOM TEETH    HX OTHER SURGICAL      wisdom teeth    HX OTHER SURGICAL  11/2020    fibroidectomy        ROS  Review of Systems   Constitutional:  Positive for chills and malaise/fatigue. Negative for fever. HENT:  Negative for congestion and sore throat. Respiratory:  Negative for cough. Cardiovascular:  Negative for chest pain and palpitations. Gastrointestinal:  Negative for abdominal pain, constipation, diarrhea, nausea and vomiting. Genitourinary:  Negative for dysuria, flank pain and urgency. Musculoskeletal:  Positive for back pain, myalgias and neck pain. Negative for falls and joint pain. Skin:  Negative for rash. Neurological:  Negative for dizziness and headaches. EXAM  Physical Exam  Constitutional:       General: She is not in acute distress. Appearance: Normal appearance. She is well-developed and normal weight. HENT:      Head: Normocephalic and atraumatic. Right Ear: Tympanic membrane and ear canal normal.      Left Ear: Tympanic membrane and ear canal normal.      Nose: Nose normal.      Mouth/Throat:      Mouth: Mucous membranes are moist.   Eyes:      Pupils: Pupils are equal, round, and reactive to light. Neck:        Comments: Firm ovoid nodule left lateral / base of neck/ Located above a ligament. No redness. Non tender. Does not follow anterior lymph chain. Cardiovascular:      Rate and Rhythm: Normal rate and regular rhythm. Heart sounds: Normal heart sounds. Pulmonary:      Effort: Pulmonary effort is normal.      Breath sounds: Normal breath sounds. Abdominal:      Palpations: Abdomen is soft. Musculoskeletal:         General: Normal range of motion. Left shoulder: No swelling. Normal range of motion. Normal strength. Arms:       Cervical back: Normal range of motion and neck supple. Tenderness present. No deformity, torticollis or bony tenderness. Normal range of motion. Back:       Comments: Tenderness base of skull/ left trapezius. Strengths 5/5 bilaterally. LE normal tone and reflexes. Lymphadenopathy:      Cervical: No cervical adenopathy. Skin:     General: Skin is warm and dry. Capillary Refill: Capillary refill takes less than 2 seconds. Neurological:      General: No focal deficit present. Mental Status: She is alert and oriented to person, place, and time. Psychiatric:         Mood and Affect: Mood normal.       ASSESSMENT/PLAN    1. Leg cramps  Diet rich in potassium/ magnesium  Stretching  Tonic water    - METABOLIC PANEL, COMPREHENSIVE;  Future  - VITAMIN D, 25 HYDROXY; Future  - METABOLIC PANEL, COMPREHENSIVE  - VITAMIN D, 25 HYDROXY    2. Other fatigue  - CBC W/O DIFF; Future  - TSH 3RD GENERATION; Future  - IRON PROFILE; Future  - VITAMIN D, 25 HYDROXY; Future  - CBC W/O DIFF  - TSH 3RD GENERATION  - IRON PROFILE  - VITAMIN D, 25 HYDROXY    3. Mid-back pain, acute  Muscle tenderness/ ibupofen/ warm compresses/ stretching  May be repetitive use/ lifting    4.  Subcutaneous nodule of neck  Labs today  Monitor for changes  Return for physical and follow up  Signed By: FLORENCE Conway     February 3, 2023

## 2023-02-03 NOTE — PROGRESS NOTES
Patient c/o upper/lower back, neck pain, leg cramping. No chief complaint on file. Vitals:    02/03/23 1021   Temp: 98.3 °F (36.8 °C)   TempSrc: Temporal   Weight: 138 lb 12.8 oz (63 kg)   PainSc:   6   PainLoc: Generalized       Current Outpatient Medications on File Prior to Visit   Medication Sig Dispense Refill    ibuprofen (MOTRIN) 800 mg tablet Take 1 Tablet by mouth every eight (8) hours as needed for Pain. 30 Tablet 1    CHOLINE-10 PO Take  by mouth daily. PNV Comb #2-Iron-Omega 3-FA 06-1-112-200 mg cmpk Take  by mouth. omega 3-dha-epa-fish oil (Fish Oil) 100-160-1,000 mg cap Take  by mouth. No current facility-administered medications on file prior to visit. Health Maintenance Due   Topic    Hepatitis C Screening     COVID-19 Vaccine (1)    DTaP/Tdap/Td series (1 - Tdap)    Cervical cancer screen     Flu Vaccine (1)    Depression Screen        1. \"Have you been to the ER, urgent care clinic since your last visit? Hospitalized since your last visit? \" No    2. \"Have you seen or consulted any other health care providers outside of the [address] [place] [address] since your last visit? \" No     3. For patients aged 39-70: Has the patient had a colonoscopy / FIT/ Cologuard? No      If the patient is female:    4. For patients aged 41-77: Has the patient had a mammogram within the past 2 years? NA - based on age or sex      11. For patients aged 21-65: Has the patient had a pap smear? Yes - Care Gap present.  Rooming MA/LPN to request most recent results PEDRO Espinoza

## 2023-02-04 LAB
25(OH)D3+25(OH)D2 SERPL-MCNC: 55.9 NG/ML (ref 30–100)
ALBUMIN SERPL-MCNC: 4.5 G/DL (ref 3.8–4.8)
ALBUMIN/GLOB SERPL: 1.9 {RATIO} (ref 1.2–2.2)
ALP SERPL-CCNC: 74 IU/L (ref 44–121)
ALT SERPL-CCNC: 31 IU/L (ref 0–32)
AST SERPL-CCNC: 30 IU/L (ref 0–40)
BILIRUB SERPL-MCNC: 0.6 MG/DL (ref 0–1.2)
BUN SERPL-MCNC: 19 MG/DL (ref 6–20)
BUN/CREAT SERPL: 26 (ref 9–23)
CALCIUM SERPL-MCNC: 9.2 MG/DL (ref 8.7–10.2)
CHLORIDE SERPL-SCNC: 104 MMOL/L (ref 96–106)
CO2 SERPL-SCNC: 22 MMOL/L (ref 20–29)
CREAT SERPL-MCNC: 0.73 MG/DL (ref 0.57–1)
EGFRCR SERPLBLD CKD-EPI 2021: 111 ML/MIN/1.73
ERYTHROCYTE [DISTWIDTH] IN BLOOD BY AUTOMATED COUNT: 12.6 % (ref 11.7–15.4)
GLOBULIN SER CALC-MCNC: 2.4 G/DL (ref 1.5–4.5)
GLUCOSE SERPL-MCNC: 99 MG/DL (ref 70–99)
HCT VFR BLD AUTO: 43.3 % (ref 34–46.6)
HGB BLD-MCNC: 15.1 G/DL (ref 11.1–15.9)
IRON SATN MFR SERPL: 14 % (ref 15–55)
IRON SERPL-MCNC: 41 UG/DL (ref 27–159)
MCH RBC QN AUTO: 32.5 PG (ref 26.6–33)
MCHC RBC AUTO-ENTMCNC: 34.9 G/DL (ref 31.5–35.7)
MCV RBC AUTO: 93 FL (ref 79–97)
PLATELET # BLD AUTO: 133 X10E3/UL (ref 150–450)
POTASSIUM SERPL-SCNC: 4.7 MMOL/L (ref 3.5–5.2)
PROT SERPL-MCNC: 6.9 G/DL (ref 6–8.5)
RBC # BLD AUTO: 4.65 X10E6/UL (ref 3.77–5.28)
SODIUM SERPL-SCNC: 142 MMOL/L (ref 134–144)
TIBC SERPL-MCNC: 299 UG/DL (ref 250–450)
TSH SERPL DL<=0.005 MIU/L-ACNC: 1.34 UIU/ML (ref 0.45–4.5)
UIBC SERPL-MCNC: 258 UG/DL (ref 131–425)
WBC # BLD AUTO: 2.9 X10E3/UL (ref 3.4–10.8)

## 2023-02-06 ENCOUNTER — PATIENT MESSAGE (OUTPATIENT)
Dept: INTERNAL MEDICINE CLINIC | Age: 33
End: 2023-02-06

## 2023-02-06 DIAGNOSIS — D69.6 THROMBOCYTOPENIA (HCC): Primary | ICD-10-CM

## 2023-02-15 ENCOUNTER — OFFICE VISIT (OUTPATIENT)
Dept: INTERNAL MEDICINE CLINIC | Age: 33
End: 2023-02-15
Payer: COMMERCIAL

## 2023-02-15 VITALS
BODY MASS INDEX: 21.82 KG/M2 | HEART RATE: 74 BPM | OXYGEN SATURATION: 96 % | HEIGHT: 67 IN | WEIGHT: 139 LBS | TEMPERATURE: 98.3 F | SYSTOLIC BLOOD PRESSURE: 97 MMHG | RESPIRATION RATE: 14 BRPM | DIASTOLIC BLOOD PRESSURE: 53 MMHG

## 2023-02-15 DIAGNOSIS — Z00.00 PHYSICAL EXAM, ANNUAL: Primary | ICD-10-CM

## 2023-02-15 DIAGNOSIS — R59.9 LYMPH NODE ENLARGEMENT: ICD-10-CM

## 2023-02-15 PROCEDURE — 99212 OFFICE O/P EST SF 10 MIN: CPT | Performed by: NURSE PRACTITIONER

## 2023-02-15 PROCEDURE — 99395 PREV VISIT EST AGE 18-39: CPT | Performed by: NURSE PRACTITIONER

## 2023-02-15 NOTE — PROGRESS NOTES
Subjective:   34 y.o. female for Well Woman Check. Her gyne and breast care is done elsewhere by her Ob-Gyne physician. Concerned about non resolution of left enlarged node. \"Feels smaller\" but still there. Review of recent labs  Still breastfeeding    Patient Active Problem List   Diagnosis Code    Fibroid uterus D25.9    Amniotic fluid leaking O42.90    Pregnancy w/ hx of uterine myomectomy O34.29     Patient Active Problem List    Diagnosis Date Noted    Amniotic fluid leaking 06/02/2022    Pregnancy w/ hx of uterine myomectomy 06/02/2022    Fibroid uterus 11/10/2020     Current Outpatient Medications   Medication Sig Dispense Refill    ibuprofen (MOTRIN) 800 mg tablet Take 1 Tablet by mouth every eight (8) hours as needed for Pain. 30 Tablet 1    CHOLINE-10 PO Take  by mouth daily. PNV Comb #2-Iron-Omega 3-FA 19-9-128-200 mg cmpk Take  by mouth. omega 3-dha-epa-fish oil (Fish Oil) 100-160-1,000 mg cap Take  by mouth.        Allergies   Allergen Reactions    Shellfish Derived Nausea and Vomiting     Past Medical History:   Diagnosis Date    Adverse effect of anesthesia     VOMITING AFTER WISDOM TEETH EXTRACTION    Heart abnormality     heart jmurmur as a child     Past Surgical History:   Procedure Laterality Date    HX GYN      fibroidectomy    HX HEENT      WISDOM TEETH    HX OTHER SURGICAL      wisdom teeth    HX OTHER SURGICAL  11/2020    fibroidectomy         Lab Results   Component Value Date/Time    WBC 2.9 (L) 02/03/2023 11:00 AM    HGB 15.1 02/03/2023 11:00 AM    HCT 43.3 02/03/2023 11:00 AM    PLATELET 185 (L) 58/58/7985 11:00 AM    MCV 93 02/03/2023 11:00 AM     Lab Results   Component Value Date/Time    Glucose 99 02/03/2023 11:00 AM    Creatinine 0.73 02/03/2023 11:00 AM      No results found for: CHOL, CHOLPOCT, HDL, LDL, LDLC, LDLCPOC, LDLCEXT, TRIGL, TGLPOCT, CHHD, CHHDX  Lab Results   Component Value Date/Time    Sodium 142 02/03/2023 11:00 AM    Potassium 4.7 02/03/2023 11:00 AM Chloride 104 02/03/2023 11:00 AM    CO2 22 02/03/2023 11:00 AM    Anion gap 8 08/26/2021 07:09 AM    Glucose 99 02/03/2023 11:00 AM    BUN 19 02/03/2023 11:00 AM    Creatinine 0.73 02/03/2023 11:00 AM    BUN/Creatinine ratio 26 (H) 02/03/2023 11:00 AM    GFR est AA >60 08/26/2021 07:09 AM    GFR est non-AA >60 08/26/2021 07:09 AM    Calcium 9.2 02/03/2023 11:00 AM    Bilirubin, total 0.6 02/03/2023 11:00 AM    ALT (SGPT) 31 02/03/2023 11:00 AM    Alk. phosphatase 74 02/03/2023 11:00 AM    Protein, total 6.9 02/03/2023 11:00 AM    Albumin 4.5 02/03/2023 11:00 AM    Globulin 2.9 08/26/2021 07:09 AM    A-G Ratio 1.9 02/03/2023 11:00 AM         ROS: Feeling generally well. No TIA's or unusual headaches, no dysphagia. No prolonged cough. No dyspnea or chest pain on exertion. No abdominal pain, change in bowel habits, black or bloody stools. No urinary tract symptoms. No new or unusual musculoskeletal symptoms. Specific concerns today: enlarged lymph node left side of neck. Present since last office visit. Repeating labs to check platelets/ wbc  . Objective: The patient appears well, alert, oriented x 3, in no distress. Visit Vitals  BP (!) 97/53 (BP 1 Location: Left upper arm, BP Patient Position: Sitting, BP Cuff Size: Adult)   Pulse 74   Temp 98.3 °F (36.8 °C) (Temporal)   Resp 14   Ht 5' 7\" (1.702 m)   Wt 139 lb (63 kg)   LMP 07/01/2021 (Within Weeks)   SpO2 96%   Breastfeeding Yes   BMI 21.77 kg/m²         ENT normal.  Neck supple. Left lateral neck 1 cm oval lesion anterior to trapezius. Soft. No thyromegaly. GIOVANNY. Lungs are clear, good air entry, no wheezes, rhonchi or rales. S1 and S2 normal, no murmurs, regular rate and rhythm. Abdomen soft without tenderness, guarding, mass or organomegaly. Extremities show no edema, normal peripheral pulses. Neurological is normal, no focal findings. Breast and Pelvic exams are deferred.     Assessment/Plan:   Well Woman  continue present plan, routine labs ordered    ICD-10-CM ICD-9-CM    1. Physical exam, annual  Z00.00 V70.0       2. Lymph node enlargement  R59.9 785.6 US HEAD NECK SOFT TISSUE        Encounter Diagnoses   Name Primary?     Physical exam, annual Yes    Lymph node enlargement      Orders Placed This Encounter    US HEAD NECK SOFT TISSUE   Return PRN  Signed By: FLORENCE Gudino     February 15, 2023

## 2023-02-16 ENCOUNTER — PATIENT MESSAGE (OUTPATIENT)
Dept: INTERNAL MEDICINE CLINIC | Age: 33
End: 2023-02-16

## 2023-04-12 PROBLEM — O34.29 PREGNANCY W/ HX OF UTERINE MYOMECTOMY: Status: RESOLVED | Noted: 2022-06-02 | Resolved: 2023-04-12

## 2023-04-12 PROBLEM — O42.90 AMNIOTIC FLUID LEAKING: Status: RESOLVED | Noted: 2022-06-02 | Resolved: 2023-04-12

## 2023-11-29 ENCOUNTER — TELEPHONE (OUTPATIENT)
Age: 33
End: 2023-11-29

## 2023-11-29 ENCOUNTER — OFFICE VISIT (OUTPATIENT)
Age: 33
End: 2023-11-29
Payer: COMMERCIAL

## 2023-11-29 VITALS
RESPIRATION RATE: 18 BRPM | OXYGEN SATURATION: 97 % | TEMPERATURE: 98.1 F | SYSTOLIC BLOOD PRESSURE: 107 MMHG | WEIGHT: 137.8 LBS | DIASTOLIC BLOOD PRESSURE: 71 MMHG | HEART RATE: 50 BPM | BODY MASS INDEX: 21.58 KG/M2

## 2023-11-29 DIAGNOSIS — K59.00 CONSTIPATION, UNSPECIFIED CONSTIPATION TYPE: Primary | ICD-10-CM

## 2023-11-29 PROCEDURE — 99213 OFFICE O/P EST LOW 20 MIN: CPT | Performed by: PHYSICIAN ASSISTANT

## 2023-11-29 ASSESSMENT — ENCOUNTER SYMPTOMS
ABDOMINAL PAIN: 1
RECTAL PAIN: 0
NAUSEA: 0
BLOOD IN STOOL: 0
CONSTIPATION: 1
COUGH: 0
VOMITING: 0
SHORTNESS OF BREATH: 0
ANAL BLEEDING: 0
DIARRHEA: 0

## 2024-04-20 ENCOUNTER — APPOINTMENT (OUTPATIENT)
Facility: HOSPITAL | Age: 34
End: 2024-04-20
Payer: COMMERCIAL

## 2024-04-20 ENCOUNTER — HOSPITAL ENCOUNTER (EMERGENCY)
Facility: HOSPITAL | Age: 34
Discharge: HOME | End: 2024-04-20
Attending: STUDENT IN AN ORGANIZED HEALTH CARE EDUCATION/TRAINING PROGRAM
Payer: COMMERCIAL

## 2024-04-20 VITALS
OXYGEN SATURATION: 100 % | WEIGHT: 145.72 LBS | SYSTOLIC BLOOD PRESSURE: 104 MMHG | RESPIRATION RATE: 12 BRPM | HEIGHT: 67 IN | TEMPERATURE: 98.3 F | BODY MASS INDEX: 22.87 KG/M2 | DIASTOLIC BLOOD PRESSURE: 69 MMHG | HEART RATE: 91 BPM

## 2024-04-20 DIAGNOSIS — N30.01 ACUTE CYSTITIS WITH HEMATURIA: ICD-10-CM

## 2024-04-20 DIAGNOSIS — Z3A.15 15 WEEKS GESTATION OF PREGNANCY: ICD-10-CM

## 2024-04-20 DIAGNOSIS — R51.9 ACUTE NONINTRACTABLE HEADACHE, UNSPECIFIED HEADACHE TYPE: Primary | ICD-10-CM

## 2024-04-20 LAB
ABDOMINAL CIRCUMFERENCE: 10.37 CM
ABDOMINAL CIRCUMFERENCE: 10.48 CM
ABDOMINAL CIRCUMFERENCE: 10.53 CM
ABDOMINAL CIRCUMFERENCE: 10.55 CM
ABO + RH BLD: NORMAL
ALBUMIN SERPL-MCNC: 3.1 G/DL (ref 3.5–5)
ALBUMIN/GLOB SERPL: 0.8 (ref 1.1–2.2)
ALP SERPL-CCNC: 64 U/L (ref 45–117)
ALT SERPL-CCNC: 34 U/L (ref 12–78)
ANION GAP SERPL CALC-SCNC: 3 MMOL/L (ref 5–15)
APPEARANCE UR: CLEAR
AST SERPL-CCNC: 21 U/L (ref 15–37)
BACTERIA URNS QL MICRO: ABNORMAL /HPF
BASOPHILS # BLD: 0 K/UL (ref 0–0.1)
BASOPHILS NFR BLD: 0 % (ref 0–1)
BILIRUB SERPL-MCNC: 0.5 MG/DL (ref 0.2–1)
BILIRUB UR QL: NEGATIVE
BIPARIETAL DIAMETER: 3.43 CM
BIPARIETAL DIAMETER: 3.52 CM
BIPARIETAL DIAMETER: 3.55 CM
BIPARIETAL DIAMETER: 3.69 CM
BLOOD GROUP ANTIBODIES SERPL: NORMAL
BUN SERPL-MCNC: 14 MG/DL (ref 6–20)
BUN/CREAT SERPL: 21 (ref 12–20)
CALCIUM SERPL-MCNC: 8.6 MG/DL (ref 8.5–10.1)
CHLORIDE SERPL-SCNC: 107 MMOL/L (ref 97–108)
CO2 SERPL-SCNC: 25 MMOL/L (ref 21–32)
COLOR UR: ABNORMAL
COMMENT:: NORMAL
COMMENT:: NORMAL
CREAT SERPL-MCNC: 0.66 MG/DL (ref 0.55–1.02)
DIFFERENTIAL METHOD BLD: ABNORMAL
EOSINOPHIL # BLD: 0 K/UL (ref 0–0.4)
EOSINOPHIL NFR BLD: 0 % (ref 0–7)
EPITH CASTS URNS QL MICRO: ABNORMAL /LPF
ERYTHROCYTE [DISTWIDTH] IN BLOOD BY AUTOMATED COUNT: 12.9 % (ref 11.5–14.5)
FLUAV RNA SPEC QL NAA+PROBE: NOT DETECTED
FLUBV RNA SPEC QL NAA+PROBE: NOT DETECTED
GLOBULIN SER CALC-MCNC: 3.9 G/DL (ref 2–4)
GLUCOSE SERPL-MCNC: 104 MG/DL (ref 65–100)
GLUCOSE UR STRIP.AUTO-MCNC: 100 MG/DL
HCG SERPL-ACNC: ABNORMAL MIU/ML (ref 0–6)
HCT VFR BLD AUTO: 38 % (ref 35–47)
HEAD CIRCUMFERENCE: 12.31 CM
HEAD CIRCUMFERENCE: 12.69 CM
HEAD CIRCUMFERENCE: 12.81 CM
HEAD CIRCUMFERENCE: 12.96 CM
HGB BLD-MCNC: 13.2 G/DL (ref 11.5–16)
HGB UR QL STRIP: ABNORMAL
HYALINE CASTS URNS QL MICRO: ABNORMAL /LPF (ref 0–5)
IMM GRANULOCYTES # BLD AUTO: 0 K/UL (ref 0–0.04)
IMM GRANULOCYTES NFR BLD AUTO: 0 % (ref 0–0.5)
KETONES UR QL STRIP.AUTO: NEGATIVE MG/DL
LEUKOCYTE ESTERASE UR QL STRIP.AUTO: ABNORMAL
LYMPHOCYTES # BLD: 0.8 K/UL (ref 0.8–3.5)
LYMPHOCYTES NFR BLD: 11 % (ref 12–49)
MCH RBC QN AUTO: 32.8 PG (ref 26–34)
MCHC RBC AUTO-ENTMCNC: 34.7 G/DL (ref 30–36.5)
MCV RBC AUTO: 94.5 FL (ref 80–99)
MONOCYTES # BLD: 0.4 K/UL (ref 0–1)
MONOCYTES NFR BLD: 5 % (ref 5–13)
NEUTS SEG # BLD: 5.8 K/UL (ref 1.8–8)
NEUTS SEG NFR BLD: 84 % (ref 32–75)
NITRITE UR QL STRIP.AUTO: NEGATIVE
NRBC # BLD: 0 K/UL (ref 0–0.01)
NRBC BLD-RTO: 0 PER 100 WBC
PH UR STRIP: 6 (ref 5–8)
PLATELET # BLD AUTO: 174 K/UL (ref 150–400)
PMV BLD AUTO: 9.5 FL (ref 8.9–12.9)
POTASSIUM SERPL-SCNC: 4.1 MMOL/L (ref 3.5–5.1)
PROT SERPL-MCNC: 7 G/DL (ref 6.4–8.2)
PROT UR STRIP-MCNC: NEGATIVE MG/DL
RBC # BLD AUTO: 4.02 M/UL (ref 3.8–5.2)
RBC #/AREA URNS HPF: ABNORMAL /HPF (ref 0–5)
RBC MORPH BLD: ABNORMAL
S PYO AG THROAT QL: NEGATIVE
SARS-COV-2 RNA RESP QL NAA+PROBE: NOT DETECTED
SODIUM SERPL-SCNC: 135 MMOL/L (ref 136–145)
SP GR UR REFRACTOMETRY: 1.02 (ref 1–1.03)
SPECIMEN EXP DATE BLD: NORMAL
SPECIMEN HOLD: NORMAL
SPECIMEN HOLD: NORMAL
URINE CULTURE IF INDICATED: ABNORMAL
UROBILINOGEN UR QL STRIP.AUTO: 0.2 EU/DL (ref 0.2–1)
WBC # BLD AUTO: 7 K/UL (ref 3.6–11)
WBC URNS QL MICRO: ABNORMAL /HPF (ref 0–4)

## 2024-04-20 PROCEDURE — 87070 CULTURE OTHR SPECIMN AEROBIC: CPT

## 2024-04-20 PROCEDURE — 86900 BLOOD TYPING SEROLOGIC ABO: CPT

## 2024-04-20 PROCEDURE — 36415 COLL VENOUS BLD VENIPUNCTURE: CPT

## 2024-04-20 PROCEDURE — 76815 OB US LIMITED FETUS(S): CPT

## 2024-04-20 PROCEDURE — 86850 RBC ANTIBODY SCREEN: CPT

## 2024-04-20 PROCEDURE — 2580000003 HC RX 258: Performed by: NURSE PRACTITIONER

## 2024-04-20 PROCEDURE — 84702 CHORIONIC GONADOTROPIN TEST: CPT

## 2024-04-20 PROCEDURE — 87636 SARSCOV2 & INF A&B AMP PRB: CPT

## 2024-04-20 PROCEDURE — 86901 BLOOD TYPING SEROLOGIC RH(D): CPT

## 2024-04-20 PROCEDURE — 81001 URINALYSIS AUTO W/SCOPE: CPT

## 2024-04-20 PROCEDURE — 99284 EMERGENCY DEPT VISIT MOD MDM: CPT

## 2024-04-20 PROCEDURE — 87880 STREP A ASSAY W/OPTIC: CPT

## 2024-04-20 PROCEDURE — 80053 COMPREHEN METABOLIC PANEL: CPT

## 2024-04-20 PROCEDURE — 85025 COMPLETE CBC W/AUTO DIFF WBC: CPT

## 2024-04-20 PROCEDURE — 87147 CULTURE TYPE IMMUNOLOGIC: CPT

## 2024-04-20 RX ORDER — CEPHALEXIN 500 MG/1
500 CAPSULE ORAL 3 TIMES DAILY
Qty: 21 CAPSULE | Refills: 0 | Status: SHIPPED | OUTPATIENT
Start: 2024-04-20 | End: 2024-04-27

## 2024-04-20 RX ORDER — 0.9 % SODIUM CHLORIDE 0.9 %
1000 INTRAVENOUS SOLUTION INTRAVENOUS ONCE
Status: COMPLETED | OUTPATIENT
Start: 2024-04-20 | End: 2024-04-20

## 2024-04-20 RX ADMIN — SODIUM CHLORIDE 1000 ML: 9 INJECTION, SOLUTION INTRAVENOUS at 18:53

## 2024-04-20 ASSESSMENT — PAIN - FUNCTIONAL ASSESSMENT
PAIN_FUNCTIONAL_ASSESSMENT: 0-10
PAIN_FUNCTIONAL_ASSESSMENT: ACTIVITIES ARE NOT PREVENTED

## 2024-04-20 ASSESSMENT — PAIN DESCRIPTION - PAIN TYPE: TYPE: ACUTE PAIN

## 2024-04-20 ASSESSMENT — PAIN DESCRIPTION - LOCATION: LOCATION: BACK

## 2024-04-20 ASSESSMENT — PAIN SCALES - GENERAL: PAINLEVEL_OUTOF10: 5

## 2024-04-20 ASSESSMENT — PAIN DESCRIPTION - DESCRIPTORS: DESCRIPTORS: ACHING

## 2024-04-20 NOTE — ED TRIAGE NOTES
Pt arrives from home for fever overnight, body aches, headache, and light spotting. Pt is 15 weeks pregnant.

## 2024-04-20 NOTE — ED PROVIDER NOTES
[facility] EMERGENCY DEP  EMERGENCY DEPARTMENT ENCOUNTER      Pt Name: Taryn Hill  MRN: [id number]  Birthdate 1990  Date of evaluation: 4/20/2024  Provider: SUZETTE Ramirez NP    CHIEF COMPLAINT       Chief Complaint   Patient presents with    Headache    Pregnancy Problem         HISTORY OF PRESENT ILLNESS   (Location/Symptom, Timing/Onset, Context/Setting, Quality, Duration, Modifying Factors, Severity)  Note limiting factors.   The history is provided by the patient. No  was used.       Taryn Hill is a 33 y.o. female with Hx of constipation who presents ambulatory by herself to [facility] ED with cc of HA.     Generalized atraumatic headache, body aches, lower back pain with \"high fever\" that started last night.  Also had some pink spotting in the setting of 15 weeks pregnancy.  Has received regular prenatal care thus far.  States  was ill earlier this week with a headache type illness.  No known sick exposures.  Last dose of Tylenol was at noon today.    Denies any urinary concerns, nausea, vomiting, diarrhea, cough, congestion, rashes.  Denies tobacco, alcohol, substance abuse.        PCP: No primary care provider on file.    There are no other complaints, changes or physical findings at this time.        Review of External Medical Records:     Nursing Notes were reviewed.    REVIEW OF SYSTEMS    (2-9 systems for level 4, 10 or more for level 5)     Review of Systems   Constitutional:  Positive for activity change, appetite change and fever.   Genitourinary:  Positive for vaginal bleeding.   Musculoskeletal:  Positive for arthralgias and myalgias.   Neurological:  Positive for headaches.       Except as noted above the remainder of the review of systems was reviewed and negative.       PAST MEDICAL HISTORY     Past Medical History:   Diagnosis Date    Adverse effect of anesthesia     VOMITING AFTER WISDOM TEETH EXTRACTION    Heart abnormality     heart jmurmur as  kg/m².    Physical Exam  Vitals and nursing note reviewed.   Constitutional:       Appearance: Normal appearance.   HENT:      Head: Normocephalic.      Right Ear: External ear normal.      Left Ear: External ear normal.      Nose: Nose normal.      Mouth/Throat:      Mouth: Mucous membranes are moist.   Eyes:      Extraocular Movements: Extraocular movements intact.      Conjunctiva/sclera: Conjunctivae normal.      Pupils: Pupils are equal, round, and reactive to light.   Cardiovascular:      Rate and Rhythm: Normal rate and regular rhythm.   Pulmonary:      Effort: Pulmonary effort is normal.      Breath sounds: Normal breath sounds.   Abdominal:      General: Abdomen is flat.      Palpations: Abdomen is soft.   Musculoskeletal:         General: Normal range of motion.      Cervical back: Normal range of motion and neck supple.   Skin:     General: Skin is warm and dry.      Capillary Refill: Capillary refill takes less than 2 seconds.   Neurological:      General: No focal deficit present.      Mental Status: She is alert and oriented to person, place, and time.   Psychiatric:         Mood and Affect: Mood normal.         Behavior: Behavior normal.         DIAGNOSTIC RESULTS     EKG: All EKG's are interpreted by the Emergency Department Physician who either signs or Co-signs this chart in the absence of a cardiologist.        RADIOLOGY:   Non-plain film images such as CT, Ultrasound and MRI are read by the radiologist. Plain radiographic images are visualized and preliminarily interpreted by the emergency physician with the below findings:        Interpretation per the Radiologist below, if available at the time of this note:    US OB 1 OR MORE FETUS LIMITED   Final Result   Single, viable, intrauterine pregnancy.           LABS:  Labs Reviewed   CBC WITH AUTO DIFFERENTIAL - Abnormal; Notable for the following components:       Result Value    Neutrophils % 84 (*)     Lymphocytes % 11 (*)     All other  and discussed reasons to return to the ER.    Amount and/or Complexity of Data Reviewed  Labs: ordered. Decision-making details documented in ED Course.  Radiology: ordered. Decision-making details documented in ED Course.    Risk  Prescription drug management.            REASSESSMENT            CONSULTS:  None    PROCEDURES:  Unless otherwise noted below, none     Procedures      FINAL IMPRESSION      1. Acute nonintractable headache, unspecified headache type    2. 15 weeks gestation of pregnancy    3. Acute cystitis with hematuria          DISPOSITION/PLAN   DISPOSITION Decision To Discharge 04/20/2024 07:37:38 PM      PATIENT REFERRED TO:  [facility] EMERGENCY DEP  [address] [place] [id number] [phone]  Go to   As needed, If symptoms worsen    your OBGYN    Schedule an appointment as soon as possible for a visit         DISCHARGE MEDICATIONS:  Discharge Medication List as of 4/20/2024  7:45 PM        START taking these medications    Details   cephALEXin (KEFLEX) 500 MG capsule Take 1 capsule by mouth 3 times daily for 7 days, Disp-21 capsule, R-0Normal               (Please note that portions of this note were completed with a voice recognition program.  Efforts were made to edit the dictations but occasionally words are mis-transcribed.)    SUZETTE Ramirez NP (electronically signed)  Emergency Attending Physician / Physician Assistant / Nurse Practitioner             Rain Plascencia APRN - NP  04/21/24 0020

## 2024-04-20 NOTE — DISCHARGE INSTRUCTIONS
The results of your lab work is reassuring.  Your strep test, COVID and flu test are all negative.  You do not have an elevated white blood cell count.  Your ultrasound demonstrates a viable 15-week pregnancy.  Continue to take Tylenol as needed for fevers.  Follow-up with your OB/GYN on Monday morning to discuss your recent ER visit.  Make sure that you are staying well-hydrated, drinking at least 8-10 large glasses of water a day.  We are providing an antibiotic for treatment of UTI.  Please return to the emergency department for any worsening or worrisome concerns.   no fever and no chills.

## 2024-04-22 LAB
BACTERIA SPEC CULT: ABNORMAL
BACTERIA SPEC CULT: ABNORMAL
SERVICE CMNT-IMP: ABNORMAL

## 2024-05-07 ENCOUNTER — HOSPITAL ENCOUNTER (OUTPATIENT)
Facility: HOSPITAL | Age: 34
Setting detail: OBSERVATION
Discharge: HOME | End: 2024-05-08
Attending: OBSTETRICS & GYNECOLOGY | Admitting: OBSTETRICS & GYNECOLOGY
Payer: COMMERCIAL

## 2024-05-07 PROBLEM — O46.92 VAGINAL BLEEDING IN PREGNANCY, SECOND TRIMESTER: Status: ACTIVE | Noted: 2024-05-07

## 2024-05-07 LAB
ABO + RH BLD: NORMAL
BASOPHILS # BLD: 0 K/UL (ref 0–0.1)
BASOPHILS NFR BLD: 0 % (ref 0–1)
BLOOD GROUP ANTIBODIES SERPL: NORMAL
COMMENT:: NORMAL
COMMENT:: NORMAL
DIFFERENTIAL METHOD BLD: ABNORMAL
EOSINOPHIL # BLD: 0.1 K/UL (ref 0–0.4)
EOSINOPHIL NFR BLD: 1 % (ref 0–7)
ERYTHROCYTE [DISTWIDTH] IN BLOOD BY AUTOMATED COUNT: 12.6 % (ref 11.5–14.5)
ERYTHROCYTE [DISTWIDTH] IN BLOOD BY AUTOMATED COUNT: 12.8 % (ref 11.5–14.5)
FETAL BLOOD VOL PATIENT KLEIH BETKE: NORMAL ML
FIBRINOGEN PPP-MCNC: 446 MG/DL (ref 200–475)
HCT VFR BLD AUTO: 31.2 % (ref 35–47)
HCT VFR BLD AUTO: 33.5 % (ref 35–47)
HGB BLD-MCNC: 11 G/DL (ref 11.5–16)
HGB BLD-MCNC: 12 G/DL (ref 11.5–16)
IMM GRANULOCYTES # BLD AUTO: 0.1 K/UL (ref 0–0.04)
IMM GRANULOCYTES NFR BLD AUTO: 1 % (ref 0–0.5)
LYMPHOCYTES # BLD: 2 K/UL (ref 0.8–3.5)
LYMPHOCYTES NFR BLD: 16 % (ref 12–49)
MCH RBC QN AUTO: 32.8 PG (ref 26–34)
MCH RBC QN AUTO: 32.9 PG (ref 26–34)
MCHC RBC AUTO-ENTMCNC: 35.3 G/DL (ref 30–36.5)
MCHC RBC AUTO-ENTMCNC: 35.8 G/DL (ref 30–36.5)
MCV RBC AUTO: 91.8 FL (ref 80–99)
MCV RBC AUTO: 93.1 FL (ref 80–99)
MONOCYTES # BLD: 0.6 K/UL (ref 0–1)
MONOCYTES NFR BLD: 5 % (ref 5–13)
NEUTS SEG # BLD: 10.2 K/UL (ref 1.8–8)
NEUTS SEG NFR BLD: 77 % (ref 32–75)
NRBC # BLD: 0 K/UL (ref 0–0.01)
NRBC # BLD: 0 K/UL (ref 0–0.01)
NRBC BLD-RTO: 0 PER 100 WBC
NRBC BLD-RTO: 0 PER 100 WBC
PLATELET # BLD AUTO: 297 K/UL (ref 150–400)
PLATELET # BLD AUTO: 300 K/UL (ref 150–400)
PMV BLD AUTO: 10.1 FL (ref 8.9–12.9)
PMV BLD AUTO: 9.9 FL (ref 8.9–12.9)
RBC # BLD AUTO: 3.35 M/UL (ref 3.8–5.2)
RBC # BLD AUTO: 3.65 M/UL (ref 3.8–5.2)
SPECIMEN EXP DATE BLD: NORMAL
SPECIMEN HOLD: NORMAL
SPECIMEN HOLD: NORMAL
WBC # BLD AUTO: 10.4 K/UL (ref 3.6–11)
WBC # BLD AUTO: 13 K/UL (ref 3.6–11)

## 2024-05-07 PROCEDURE — G0378 HOSPITAL OBSERVATION PER HR: HCPCS

## 2024-05-07 PROCEDURE — 85025 COMPLETE CBC W/AUTO DIFF WBC: CPT

## 2024-05-07 PROCEDURE — 76811 OB US DETAILED SNGL FETUS: CPT | Performed by: OBSTETRICS & GYNECOLOGY

## 2024-05-07 PROCEDURE — 96360 HYDRATION IV INFUSION INIT: CPT

## 2024-05-07 PROCEDURE — 36415 COLL VENOUS BLD VENIPUNCTURE: CPT

## 2024-05-07 PROCEDURE — 88305 TISSUE EXAM BY PATHOLOGIST: CPT

## 2024-05-07 PROCEDURE — 2580000003 HC RX 258: Performed by: OBSTETRICS & GYNECOLOGY

## 2024-05-07 PROCEDURE — 87086 URINE CULTURE/COLONY COUNT: CPT

## 2024-05-07 PROCEDURE — 96361 HYDRATE IV INFUSION ADD-ON: CPT

## 2024-05-07 PROCEDURE — 85384 FIBRINOGEN ACTIVITY: CPT

## 2024-05-07 PROCEDURE — 6370000000 HC RX 637 (ALT 250 FOR IP): Performed by: OBSTETRICS & GYNECOLOGY

## 2024-05-07 PROCEDURE — 99203 OFFICE O/P NEW LOW 30 MIN: CPT | Performed by: OBSTETRICS & GYNECOLOGY

## 2024-05-07 PROCEDURE — 76817 TRANSVAGINAL US OBSTETRIC: CPT | Performed by: OBSTETRICS & GYNECOLOGY

## 2024-05-07 PROCEDURE — G0379 DIRECT REFER HOSPITAL OBSERV: HCPCS

## 2024-05-07 PROCEDURE — 6370000000 HC RX 637 (ALT 250 FOR IP)

## 2024-05-07 PROCEDURE — 85027 COMPLETE CBC AUTOMATED: CPT

## 2024-05-07 PROCEDURE — 85460 HEMOGLOBIN FETAL: CPT

## 2024-05-07 PROCEDURE — 86901 BLOOD TYPING SEROLOGIC RH(D): CPT

## 2024-05-07 PROCEDURE — 88309 TISSUE EXAM BY PATHOLOGIST: CPT

## 2024-05-07 PROCEDURE — 86900 BLOOD TYPING SEROLOGIC ABO: CPT

## 2024-05-07 PROCEDURE — 86850 RBC ANTIBODY SCREEN: CPT

## 2024-05-07 RX ORDER — FENTANYL CITRATE 50 UG/ML
50 INJECTION, SOLUTION INTRAMUSCULAR; INTRAVENOUS
Status: DISCONTINUED | OUTPATIENT
Start: 2024-05-07 | End: 2024-05-07

## 2024-05-07 RX ORDER — MISOPROSTOL 200 UG/1
TABLET ORAL
Status: COMPLETED
Start: 2024-05-07 | End: 2024-05-07

## 2024-05-07 RX ORDER — MISOPROSTOL 200 UG/1
800 TABLET ORAL
Status: COMPLETED | OUTPATIENT
Start: 2024-05-07 | End: 2024-05-07

## 2024-05-07 RX ORDER — IBUPROFEN 600 MG/1
600 TABLET ORAL EVERY 6 HOURS PRN
Status: DISCONTINUED | OUTPATIENT
Start: 2024-05-07 | End: 2024-05-08 | Stop reason: HOSPADM

## 2024-05-07 RX ORDER — ACETAMINOPHEN 325 MG/1
650 TABLET ORAL EVERY 6 HOURS PRN
Status: DISCONTINUED | OUTPATIENT
Start: 2024-05-07 | End: 2024-05-08 | Stop reason: HOSPADM

## 2024-05-07 RX ORDER — PROGESTERONE 100 MG/1
200 CAPSULE ORAL NIGHTLY
Status: DISCONTINUED | OUTPATIENT
Start: 2024-05-08 | End: 2024-05-08 | Stop reason: HOSPADM

## 2024-05-07 RX ORDER — SODIUM CHLORIDE, SODIUM LACTATE, POTASSIUM CHLORIDE, CALCIUM CHLORIDE 600; 310; 30; 20 MG/100ML; MG/100ML; MG/100ML; MG/100ML
INJECTION, SOLUTION INTRAVENOUS CONTINUOUS
Status: DISCONTINUED | OUTPATIENT
Start: 2024-05-07 | End: 2024-05-08 | Stop reason: HOSPADM

## 2024-05-07 RX ORDER — DIPHENHYDRAMINE HCL 25 MG
25 CAPSULE ORAL EVERY 6 HOURS PRN
Status: DISCONTINUED | OUTPATIENT
Start: 2024-05-07 | End: 2024-05-07

## 2024-05-07 RX ORDER — ZOLPIDEM TARTRATE 5 MG/1
5 TABLET ORAL NIGHTLY PRN
Status: DISCONTINUED | OUTPATIENT
Start: 2024-05-07 | End: 2024-05-07

## 2024-05-07 RX ADMIN — ACETAMINOPHEN 650 MG: 325 TABLET ORAL at 18:53

## 2024-05-07 RX ADMIN — SODIUM CHLORIDE, POTASSIUM CHLORIDE, SODIUM LACTATE AND CALCIUM CHLORIDE: 600; 310; 30; 20 INJECTION, SOLUTION INTRAVENOUS at 12:30

## 2024-05-07 RX ADMIN — MISOPROSTOL 800 MCG: 200 TABLET ORAL at 20:20

## 2024-05-07 ASSESSMENT — PAIN DESCRIPTION - DESCRIPTORS: DESCRIPTORS: ACHING;CRAMPING

## 2024-05-07 ASSESSMENT — PAIN DESCRIPTION - ORIENTATION: ORIENTATION: RIGHT;LEFT

## 2024-05-07 ASSESSMENT — PAIN - FUNCTIONAL ASSESSMENT: PAIN_FUNCTIONAL_ASSESSMENT: ACTIVITIES ARE NOT PREVENTED

## 2024-05-07 ASSESSMENT — PAIN DESCRIPTION - LOCATION: LOCATION: ABDOMEN;BACK

## 2024-05-07 NOTE — PROGRESS NOTES
0745: Pt called out stating that she was having increased abdominal cramping. Offered pt PRN fentanyl. Pt then stated that she felt a gush of fluids and heard a \"pop\" noise. Nurse at bedside and pt noted to have \"golf sized\" clot and bright red bleed. MD was called by other nurse and was notified of bleed.    0755: MD at bedside and was not able to attempt exam due to bulging bag. MD requested pt to be transferred to L&D immediately.     0805: TRANSFER - OUT REPORT:    Verbal report given to XAVI Moreland on Taryn Hill  being transferred to L&D for urgent transfer       Report consisted of patient's Situation, Background, Assessment and   Recommendations(SBAR).     Information from the following report(s) Nurse Handoff Report, Intake/Output, MAR, Recent Results, and Med Rec Status was reviewed with the receiving nurse.           Lines:   Peripheral IV 05/07/24 Right Antecubital (Active)   Site Assessment Clean, dry & intact 05/07/24 1230   Line Status Flushed;Infusing 05/07/24 1230   Line Care Connections checked and tightened 05/07/24 1230   Phlebitis Assessment No symptoms 05/07/24 1230   Infiltration Assessment 0 05/07/24 1230   Alcohol Cap Used Yes 05/07/24 1230   Dressing Status Clean, dry & intact 05/07/24 1230   Dressing Type Transparent 05/07/24 1230        Opportunity for questions and clarification was provided.      Patient transported with:  Registered Nurse

## 2024-05-07 NOTE — PROCEDURES
PATIENT: DANIELA GARCES   -  : 1990   -  DOS:2024   -  INTERPRETING PROVIDER:Jose Dodd,   Indication  ========    2nd Trimester Vaginal Bleeding, History of myomectomy followed by a term vaginal delivery. History of possible cervical shortening. Cervical shortening. Extensive amnion  chorion separation and presumed intra uterine bleeding. Suspected abruption of placenta. Placental venous lake.    Method  ======    Transabdominal and transvaginal ultrasound examination. View: Sufficient    Dating  ======    Ultrasound examination on: 2024  GA by U/S based upon: AC, BPD, Femur, HC  GA by U/S 18 w + 4 d  VENESSA by U/S: 10/4/2024  Assigned: based on ultrasound (AC, BPD, Femur, HC), selected on 2024  Assigned GA 18 w + 4 d  Assigned VENESSA: 10/4/2024    Fetal Growth Overview  =================    Exam date        GA              BPD (mm)          HC (mm)              AC (mm)              FL (mm)             HL (mm)         EFW (g)  2024          18w 4d        42.1    59%        154.6     34%        139.1    71%        25.7     18%                             247    45%    Fetal Biometry  ============    Standard  BPD 42.1 mm 18w 5d 59% Hadlock  OFD 53.3 mm 19w 1d 72% Marce  .6 mm 18w 3d 34% Hadlock  Cerebellum tr 19.2 mm 18w 5d 52% Hill  Nuchal fold 2.9 mm  .1 mm 19w 2d 71% Hadlock  Femur 25.7 mm 17w 6d 18% Hadlock   g 18w 3d 45% Hadlock  EFW (lb) 0 lb  EFW (oz) 9 oz  EFW by: Hadlock (BPD-HC-AC-FL)  Extended   7.8 mm  CM 3.8 mm  24% Nicolaides  Nasal bone 5.2 mm  Head / Face / Neck  Nasal bone: present  Other Structures   bpm    General Evaluation  ==============    Cardiac activity present.  bpm. Fetal movements: visualized. Presentation: Cephalic  Placenta: Placental site: anterior, appropriate distance from the internal os  Umbilical cord: Cord vessels: 3 vessel cord. Insertion site: central  Amniotic fluid: Amount of AF: normal. MVP 2.7 cm. There

## 2024-05-07 NOTE — H&P
Obstetrics Admission H&P    aTryn Hill  [id number]  1990    Chief Complaint:  Pregnancy and bleeding    HPI: 33 y.o. female   17 6/7 weeks with Estimated Date of Delivery: 10/9/24 by LMP c/w 8 week ultrasound.  Pregnancy has been complicated by vaginal bleeding that started around 24. Patient complains of  lower abdominal tightening/cramping-not as much as she had a few weeks ago, but getting uncomfortable. She reports that the bleeding is getting heavier with clots. She denies dizziness/cp/sob.    Patient was seen in ER on  for bleeding. Followed up in our office several days later and u/s appeared as though she had a short cervix with funneling. [facility] and it was found that what appeared to be funneling was just the ELIANA with a thick and long cervix below this. Precautions given. She was started on prometrium and had plan for follow up with them tomorrow. Pt had worsening bleeding yesterday and this morning so was seen in my office today. Cervix still long but possible slight dilation (more of an easily molded cervix than true dilation). As she was uncomfortable and worried about possible delivery at home, we have admitted her to observation to check cbc, give ivf's and treat pain as needed.     ROS:  Pertinent items are noted in HPI.    OB History          3    Para        Term   1            AB   1    Living   1         SAB        IAB        Ectopic        Molar        Multiple        Live Births                  Past Medical History:   Diagnosis Date    Adverse effect of anesthesia     VOMITING AFTER WISDOM TEETH EXTRACTION    Heart abnormality     heart jmurmur as a child     Past Surgical History:   Procedure Laterality Date    GYN      fibroidectomy    HEENT      WISDOM TEETH    OTHER SURGICAL HISTORY  2020    fibroidectomy     OTHER SURGICAL HISTORY      wisdom teeth     Social History     Socioeconomic History    Marital status:

## 2024-05-07 NOTE — CONSULTS
CC: 2nd Trimester Vaginal Bleeding, History of myomectomy followed by a term vaginal delivery. History of possible cervical shortening. Cervical shortening. Extensive amnion chorion separation and presumed intra uterine bleeding. Suspected abruption of placenta. Placental venous lake.     I met with patient and her  in our office this afternoon. Dr. Cedillo requested an ultrasound examination and [facility] consultation on this patient.     Patient is a  3 para 1-0-1-1 at approximately 18 weeks and 4 days gestation. Her pregnancy course has been uncomplicated until recently. Patient is experienced vaginal bleeding. That has continued. At times it is very heavy. Most of the time it is bright red but occasionally it is brown. She denies leakage of fluid. She denied history of trauma. She complains of uterine cramping. She denies any history of fever or chills. Ultrasound examinations done elsewhere suggested cervical shortening. However, that was not confirmed. Patient was transiently given progesterone vaginal suppositories. Because of the continuing bleeding, patient was admitted to the hospital for further evaluation.     Patient past medical history is noncontributory and benign in detail except for history of a pedunculated/subserosal leiomyoma that was removed in . Subsequent to that, patient had an uncomplicated pregnancy and vaginal delivery. She experienced a first trimester pregnancy loss.     Parental family and genetic histories were reported as noncontributory and benign.     Patient's hemoglobin has remained relatively stable. Routine antepartum laboratory studies were reported as being within normal limits. NIPT screening for trisomy 21, 18 and 13 was reported as negative. MSAFP screening was negative.     Patient's vital signs have remained stable and normal. She is afebrile. They have not described uterine tenderness. On pelvic examination, Cx was described as closed.

## 2024-05-07 NOTE — PROGRESS NOTES
Attempted to deliver and verbally explain the VOON with patient. Patient was CHAPINCITO (Off The Floor). Jennifer Snyder, Care Management Assistant

## 2024-05-08 VITALS
RESPIRATION RATE: 20 BRPM | OXYGEN SATURATION: 98 % | SYSTOLIC BLOOD PRESSURE: 96 MMHG | HEART RATE: 67 BPM | TEMPERATURE: 98 F | DIASTOLIC BLOOD PRESSURE: 55 MMHG

## 2024-05-08 LAB
BACTERIA SPEC CULT: NORMAL
SERVICE CMNT-IMP: NORMAL

## 2024-05-08 NOTE — L&D DELIVERY NOTE
Called to bedside on [facility] after pt ROM'd.  When I arrived, pt was reporting some cramping and feeling like something was coming out of her vagina.  I gloved to check her, but there were bulging membranes outside of the vagina.  I opted to not check her at that point and proceeded to move to L&D.  Before we could get her to L&D, she spontaneously delivered en caul.  As there was no precip kit and pt did not want to see or know what the baby was until her  got back, we quickly transported her to L&D.  Baby was completely delivered en caul, with a 6 cm clot.  Placenta did not immediately deliver.  Baby was moved to the warmer.  Her  arrived just after delivery.  They wanted to be told the gender.  Pictures were taken on the pt's phone of the baby en caul and then once membranes were opened.  Male gender.  They were told the gender and felt ready to see and hold him.  Rectal cytotec was given but then the placenta could be felt in the vagina and grasped with hemostats and delivered completely and intact.    They were then left in room to have some privacy with their son.  I returned about 20 minutes later and dad cut the cord.  They were both grieving appropriately and supporting each other.  They would like genetic testing done.  Placenta to path.  They also wished to have the  come bless the baby.  They named him Jay Cole.

## 2024-05-08 NOTE — PROGRESS NOTES
2019: placenta out     2020: cytotec placed by Dr Munguia    2025: mother holding baby, FOB at bedside supporting her. [place] care requested.     2038: syed paged     2040: Dr Munguia and this RN at bedside, FOB cut excess cord. Bleeding assessed, bleeding WNL. Chux pads changed    2140: bleeding assessed, WNL- light, no clots. Patient would like to go home tonight after memory box and paperwork are complete    2150: [place] at bedside. Family has named baby Jay     2300: RN at bedside completing photos and footprints     0000: patient reports bleeding is light, passing no clots at this time. discharge teaching completed, all questions answered     0010: Patient left unit via wheelchair

## 2024-05-08 NOTE — DISCHARGE SUMMARY
Obstetrical Discharge Summary     Name: Taryn Hill MRN: [id number]  SSN: [id number]    YOB: 1990  Age: 33 y.o.  Sex: female      Admit Date: 5/7/2024    Discharge Date: 5/7/2024     Admitting Physician: Osmel Cedillo DO     Attending Physician:  Osmel Cedillo DO     Admission Diagnoses: Vaginal bleeding in pregnancy, second trimester [O46.92]    Discharge Diagnoses:   This patient has no babies on file.     Additional Diagnoses:  No components found for: \"OBEXTABORH\", \"OBEXTABSCRN\", \"OBEXTRUBELLA\", \"OBEXTGRBS\"    Hospital Course: 33 y.o. female   17 6/7 weeks with Estimated Date of Delivery: 10/9/24 by LMP c/w 8 week ultrasound.  Pregnancy has been complicated by vaginal bleeding that started around 4/20/24. Patient complains of  lower abdominal tightening/cramping-not as much as she had a few weeks ago, but getting uncomfortable. She reports that the bleeding is getting heavier with clots. She denies dizziness/cp/sob.    Patient was seen in ER on 4/20 for bleeding. Followed up in our office several days later and u/s appeared as though she had a short cervix with funneling. She was seen by [facility] on 4/24/24 and it was found that what appeared to be funneling was just the ELIANA with a thick and long cervix below this. Precautions given. She was started on prometrium and had plan for follow up with them tomorrow. Pt had worsening bleeding yesterday and this morning so was seen in my office today. Cervix still long but possible slight dilation (more of an easily molded cervix than true dilation). As she was uncomfortable and worried about possible delivery at home, we have admitted her to observation to check cbc, give ivf's and treat pain as needed.    Called to bedside on [facility]SU after pt ROM'd.  When I arrived, pt was reporting some cramping and feeling like something was coming out of her vagina.  I gloved to check her, but there were bulging membranes outside of the vagina.  I opted

## 2024-05-08 NOTE — PROGRESS NOTES
Spiritual Care Assessment/Progress Note  [facility]    Name: Taryn Hill MRN: [id number]    Age: 33 y.o.     Sex: female   Language: English     Date: 2024            Total Time Calculated: 57 min              Spiritual Assessment begun in [facility] 3 LABOR & DELIVERY  Service Provided For: Patient and family together  Referral/Consult From: Nurse  Encounter Overview/Reason: Crisis    Spiritual beliefs:      [] Involved in a jefry tradition/spiritual practice:      [] Supported by a jefry community:      [] Claims no spiritual orientation:      [] Seeking spiritual identity:           [x] Adheres to an individual form of spirituality:      [] Not able to assess:                Identified resources for coping and support system:   Support System: Spouse, Family members       [] Prayer                  [] Devotional reading               [] Music                  [] Guided Imagery     [] Pet visits                                        [] Other: (Family)     Specific area/focus of visit   Encounter:    Crisis:    Spiritual/Emotional needs: Type: Emotional Distress  Ritual, Rites and Sacraments:    Grief, Loss, and Adjustments: Type: Life Adjustments,  loss/ Death, , End of Life, Bereavement Care  Ethics/Mediation:    Behavioral Health:    Palliative Care:    Advance Care Planning:           Narrative:   Referral source:  initiated visit to Taryn Hill at [facility] in [facility] 3 LABOR & DELIVERY. I reviewed the medical record prior to this encounter.     Spiritual Assessment:  17 week death of Jay Hill    Provided support to  Taryn Hill and her spouse Rui. Though sad for her loss, Taryn talked about seeing this also as a blessing. She shared about their plans to move back to  [place] where both she and Rui could be closer to family. Explored spiritual, relational, and emotional needs; provided empathic listening, [place]

## (undated) DEVICE — SURGICAL PROCEDURE PACK BASIN MAJ SET CUST NO CAUT

## (undated) DEVICE — PREP SKN CHLRAPRP APL 26ML STR --

## (undated) DEVICE — PAD,SANITARY,11 IN,MAXI,N-STRL,IND WRAP: Brand: MEDLINE

## (undated) DEVICE — STERILE POLYISOPRENE POWDER-FREE SURGICAL GLOVES WITH EMOLLIENT COATING: Brand: PROTEXIS

## (undated) DEVICE — SYR LR LCK 1ML GRAD NSAF 30ML --

## (undated) DEVICE — SPONGE GZ W4XL4IN COT RADPQ HIGHLY ABSRB

## (undated) DEVICE — SUTURE MCRYL SZ 0 L27IN ABSRB VLT CT-1 L36MM 1/2 CIR TAPR Y340H

## (undated) DEVICE — DRESSING ALG W4XL8IN AG FOAM SUPERABSORBENT SIL ANTIMIC

## (undated) DEVICE — TOWEL SURG W17XL27IN STD BLU COT NONFENESTRATED PREWASHED

## (undated) DEVICE — SUTURE PLN GUT SZ 2-0 L27IN ABSRB YELLOWISH TAN L70MM XLH 53T

## (undated) DEVICE — SOLUTION IV 1000ML 0.9% SOD CHL

## (undated) DEVICE — NEEDLE HYPO 22GA L1.5IN BLK S STL HUB POLYPR SHLD REG BVL

## (undated) DEVICE — Device

## (undated) DEVICE — GARMENT,MEDLINE,DVT,INT,CALF,MED, GEN2: Brand: MEDLINE

## (undated) DEVICE — SUTURE MCRYL SZ 4-0 L27IN ABSRB UD L19MM PS-2 1/2 CIR PRIM Y426H

## (undated) DEVICE — ROCKER SWITCH PENCIL BLADE ELECTRODE, HOLSTER: Brand: EDGE

## (undated) DEVICE — SUTURE VCRL SZ 2-0 L36IN ABSRB UD L40MM CT 1/2 CIR J957H

## (undated) DEVICE — SOLUTION IRRIG 1000ML H2O STRL BLT

## (undated) DEVICE — PAD NON-ADHERENT 3X4 STRL LF --

## (undated) DEVICE — PACK,BASIC,SIRUS,V: Brand: MEDLINE

## (undated) DEVICE — SUTURE MCRYL SZ 2-0 L36IN ABSRB UD L36MM CT-1 1/2 CIR Y945H

## (undated) DEVICE — INTENDED FOR TISSUE SEPARATION, AND OTHER PROCEDURES THAT REQUIRE A SHARP SURGICAL BLADE TO PUNCTURE OR CUT.: Brand: BARD-PARKER ® CARBON RIB-BACK BLADES

## (undated) DEVICE — SUTURE MCRYL SZ 3-0 L27IN ABSRB UD L26MM SH 1/2 CIR Y416H

## (undated) DEVICE — GOWN,SIRUS,NONRNF,SETINSLV,2XL,18/CS: Brand: MEDLINE

## (undated) DEVICE — TRAY PREP DRY W/ PREM GLV 2 APPL 6 SPNG 2 UNDPD 1 OVERWRAP

## (undated) DEVICE — REM POLYHESIVE ADULT PATIENT RETURN ELECTRODE: Brand: VALLEYLAB

## (undated) DEVICE — INFECTION CONTROL KIT SYS

## (undated) DEVICE — STRAP,POSITIONING,KNEE/BODY,FOAM,4X60": Brand: MEDLINE

## (undated) DEVICE — SUTURE VCRL SZ 1 L36IN ABSRB UD L36MM CT-1 1/2 CIR J947H

## (undated) DEVICE — SPONGE LAP 18X18IN STRL -- 5/PK

## (undated) DEVICE — SURGICAL PROCEDURE PACK TISS 3X5 IN ABSORBABLE SEPRAFILM

## (undated) DEVICE — DRAPE,T,LAPARO,TRANS,STERILE: Brand: MEDLINE

## (undated) DEVICE — TOTAL TRAY, DB, 100% SILI FOLEY, 16FR 10: Brand: MEDLINE